# Patient Record
Sex: FEMALE | Race: OTHER | NOT HISPANIC OR LATINO
[De-identification: names, ages, dates, MRNs, and addresses within clinical notes are randomized per-mention and may not be internally consistent; named-entity substitution may affect disease eponyms.]

---

## 2021-10-06 PROBLEM — Z00.00 ENCOUNTER FOR PREVENTIVE HEALTH EXAMINATION: Status: ACTIVE | Noted: 2021-10-06

## 2023-01-03 ENCOUNTER — NON-APPOINTMENT (OUTPATIENT)
Age: 78
End: 2023-01-03

## 2023-01-03 ENCOUNTER — APPOINTMENT (OUTPATIENT)
Dept: ORTHOPEDIC SURGERY | Facility: CLINIC | Age: 78
End: 2023-01-03
Payer: MEDICARE

## 2023-01-03 VITALS
SYSTOLIC BLOOD PRESSURE: 155 MMHG | BODY MASS INDEX: 41.23 KG/M2 | DIASTOLIC BLOOD PRESSURE: 87 MMHG | HEART RATE: 67 BPM | WEIGHT: 210 LBS | HEIGHT: 60 IN

## 2023-01-03 DIAGNOSIS — M17.11 UNILATERAL PRIMARY OSTEOARTHRITIS, RIGHT KNEE: ICD-10-CM

## 2023-01-03 DIAGNOSIS — M17.12 UNILATERAL PRIMARY OSTEOARTHRITIS, LEFT KNEE: ICD-10-CM

## 2023-01-03 PROCEDURE — 99204 OFFICE O/P NEW MOD 45 MIN: CPT

## 2023-01-03 PROCEDURE — 73562 X-RAY EXAM OF KNEE 3: CPT | Mod: LT,RT

## 2023-01-03 NOTE — END OF VISIT
[FreeTextEntry3] : I, Dr. Wolf, personally performed the evaluation and management services for this established patient who presented today with a new problem/exacerbation of an existing condition. That E/M includes conducting the examination, assessing all new/exacerbated conditions, and establishing a new plan of care. Today, MY ACP was here to assist my evaluation and management services of this new problem/exacerbated condition to be followed going forward.\par

## 2023-01-03 NOTE — HISTORY OF PRESENT ILLNESS
[Worsening] : worsening [8] : a current pain level of 8/10 [Sitting] : sitting [Standing] : standing [Constant] : ~He/She~ states the symptoms seem to be constant [Bending] : worsened by bending [Direct Pressure] : worsened by direct pressure [Walking] : worsened by walking [Knee Flexion] : worsened with knee flexion [Knee Extension] : worsened with knee extension [Acetaminophen] : relieved by acetaminophen [NSAIDs] : relieved by nonsteroidal anti-inflammatory drugs [Rest] : relieved by rest [de-identified] : 77-year-old female presents with bilateral knee pain ongoing for several years worsening over the past several years as well.  She notes that the pain began on its own and has gradually worsened.  She has tried viscosupplementation injections as well as cortisone injections without relief.  Both knees hurt constantly more so at night.  She notes she has a difficult time getting up from a seated position.  She is ambulating with a rolling walker due to the inability to use the knees as well as to pain.  She is taking Celebrex as well as Tylenol with some relief.  No history of any injuries.  She states both knees are relatively about the same in terms of pain level.  She rates the pain as an 8 out of 10\par Denies any fevers chills chest pain calf pain shortness of breath

## 2023-01-03 NOTE — PHYSICAL EXAM
[Antalgic] : antalgic [Walker] : ambulates with walker [LE] : Sensory: Intact in bilateral lower extremities [DP] : dorsalis pedis 2+ and symmetric bilaterally [Normal RLE] : Right Lower Extremity: No scars, rashes, lesions, ulcers, skin intact [Normal LLE] : Left Lower Extremity: No scars, rashes, lesions, ulcers, skin intact [Normal Touch] : sensation intact for touch [Normal] : no peripheral adenopathy appreciated [de-identified] : On physical exam of the right knee skin is warm and dry without erythema ecchymosis signs or symptoms of infection superficial or deep.  There is no tenderness to palpation.  Range of motion is 0-120 with pain at extremes of flexion.  The knee is stable in varus and valgus stresses.  There is no anterior posterior drawer.  There is a negative Homans.  2+ dorsalis pedis pulses.  Sensation is intact throughout the distal lower extremities strength is well-maintained.\par \par On physical exam of the left knee skin is warm and dry without erythema ecchymosis signs or symptoms of infection superficial or deep.  There is no tenderness to palpation.  Range of motion is 0-120 with pain at extremes of flexion.  The knee is stable in varus and valgus stresses.  There is no anterior posterior drawer.  There is a negative Homans.  2+ dorsalis pedis pulses.  Sensation is intact throughout the distal lower extremities strength is well-maintained. [de-identified] : Radiographs of the right and left knees were performed today in the [facility] office.\par Grade 4 bone-on-bone cartilaginous wear in the medial compartment.\par Subchondral sclerosis noted on both sides the T-F  joint.\par Osteophytes are noted in all three compartments.\par

## 2023-01-03 NOTE — DISCUSSION/SUMMARY
[Medication Risks Reviewed] : Medication risks reviewed [Surgical risks reviewed] : Surgical risks reviewed [de-identified] : Dr. Wolf evaluated this patient and will be guiding this patient's entire orthopedic management plan. The patient was advised that based upon their clinical presentation and radiographic findings they meet inclusion criteria for benefitting from a left total knee arthroplasty as a treatment option for severe arthritis of the knee. Patient has tried and failed conservative management including viscosupplementation and cortisone injections\par \par The spectrum of treatments for severe knee DJD were reviewed in detail. I reviewed in detail the goals, alternatives, risks and benefits of total knee arthroplasty. The patient was demonstrated. A model of the total knee replacement. Advised on the brand and preliminary model of the implant that I use. The patient agrees to this plan of care.\par \par The patient was advised of the possible complications which are inclusive of but not exclusive to VTE-related, infectious-related, anesthetic-related, surgically-related, medically-related, and implant-related.\par \par The patient was advised that they will require a medical preoperative risk evaluation by their PCP. Further medical subspecialty clearances such as cardiac may be indicated if felt needed by their PCP.\par \par The patient was advised he/she may call our office after careful consideration of their treatment options and further consultation with any other physician, to begin the process of preoperative planning for elective left TKR at a time of their choosing.

## 2023-02-24 ENCOUNTER — OUTPATIENT (OUTPATIENT)
Dept: OUTPATIENT SERVICES | Facility: HOSPITAL | Age: 78
LOS: 1 days | End: 2023-02-24
Payer: MEDICARE

## 2023-02-24 VITALS
RESPIRATION RATE: 14 BRPM | WEIGHT: 214.95 LBS | SYSTOLIC BLOOD PRESSURE: 140 MMHG | DIASTOLIC BLOOD PRESSURE: 80 MMHG | OXYGEN SATURATION: 99 % | HEIGHT: 61 IN | HEART RATE: 66 BPM | TEMPERATURE: 98 F

## 2023-02-24 DIAGNOSIS — Z90.49 ACQUIRED ABSENCE OF OTHER SPECIFIED PARTS OF DIGESTIVE TRACT: Chronic | ICD-10-CM

## 2023-02-24 DIAGNOSIS — Z01.818 ENCOUNTER FOR OTHER PREPROCEDURAL EXAMINATION: ICD-10-CM

## 2023-02-24 DIAGNOSIS — Z98.890 OTHER SPECIFIED POSTPROCEDURAL STATES: Chronic | ICD-10-CM

## 2023-02-24 DIAGNOSIS — Z98.51 TUBAL LIGATION STATUS: Chronic | ICD-10-CM

## 2023-02-24 DIAGNOSIS — M17.12 UNILATERAL PRIMARY OSTEOARTHRITIS, LEFT KNEE: ICD-10-CM

## 2023-02-24 DIAGNOSIS — Z98.49 CATARACT EXTRACTION STATUS, UNSPECIFIED EYE: Chronic | ICD-10-CM

## 2023-02-24 LAB
A1C WITH ESTIMATED AVERAGE GLUCOSE RESULT: 6.7 % — HIGH (ref 4–5.6)
ALBUMIN SERPL ELPH-MCNC: 3.7 G/DL — SIGNIFICANT CHANGE UP (ref 3.3–5)
ALP SERPL-CCNC: 114 U/L — SIGNIFICANT CHANGE UP (ref 30–120)
ALT FLD-CCNC: 18 U/L DA — SIGNIFICANT CHANGE UP (ref 10–60)
ANION GAP SERPL CALC-SCNC: 7 MMOL/L — SIGNIFICANT CHANGE UP (ref 5–17)
APTT BLD: 29.9 SEC — SIGNIFICANT CHANGE UP (ref 27.5–35.5)
AST SERPL-CCNC: 21 U/L — SIGNIFICANT CHANGE UP (ref 10–40)
BILIRUB SERPL-MCNC: 0.7 MG/DL — SIGNIFICANT CHANGE UP (ref 0.2–1.2)
BLD GP AB SCN SERPL QL: SIGNIFICANT CHANGE UP
BUN SERPL-MCNC: 25 MG/DL — HIGH (ref 7–23)
CALCIUM SERPL-MCNC: 9.2 MG/DL — SIGNIFICANT CHANGE UP (ref 8.4–10.5)
CHLORIDE SERPL-SCNC: 105 MMOL/L — SIGNIFICANT CHANGE UP (ref 96–108)
CO2 SERPL-SCNC: 28 MMOL/L — SIGNIFICANT CHANGE UP (ref 22–31)
CREAT SERPL-MCNC: 1.59 MG/DL — HIGH (ref 0.5–1.3)
EGFR: 33 ML/MIN/1.73M2 — LOW
ESTIMATED AVERAGE GLUCOSE: 146 MG/DL — HIGH (ref 68–114)
GLUCOSE SERPL-MCNC: 128 MG/DL — HIGH (ref 70–99)
HCT VFR BLD CALC: 41.6 % — SIGNIFICANT CHANGE UP (ref 34.5–45)
HGB BLD-MCNC: 14 G/DL — SIGNIFICANT CHANGE UP (ref 11.5–15.5)
INR BLD: 1.11 RATIO — SIGNIFICANT CHANGE UP (ref 0.88–1.16)
MCHC RBC-ENTMCNC: 30.9 PG — SIGNIFICANT CHANGE UP (ref 27–34)
MCHC RBC-ENTMCNC: 33.7 GM/DL — SIGNIFICANT CHANGE UP (ref 32–36)
MCV RBC AUTO: 91.8 FL — SIGNIFICANT CHANGE UP (ref 80–100)
NRBC # BLD: 0 /100 WBCS — SIGNIFICANT CHANGE UP (ref 0–0)
PLATELET # BLD AUTO: 238 K/UL — SIGNIFICANT CHANGE UP (ref 150–400)
POTASSIUM SERPL-MCNC: 5.3 MMOL/L — SIGNIFICANT CHANGE UP (ref 3.5–5.3)
POTASSIUM SERPL-SCNC: 5.3 MMOL/L — SIGNIFICANT CHANGE UP (ref 3.5–5.3)
PROT SERPL-MCNC: 7.4 G/DL — SIGNIFICANT CHANGE UP (ref 6–8.3)
PROTHROM AB SERPL-ACNC: 13.1 SEC — SIGNIFICANT CHANGE UP (ref 10.5–13.4)
RBC # BLD: 4.53 M/UL — SIGNIFICANT CHANGE UP (ref 3.8–5.2)
RBC # FLD: 13.2 % — SIGNIFICANT CHANGE UP (ref 10.3–14.5)
SODIUM SERPL-SCNC: 140 MMOL/L — SIGNIFICANT CHANGE UP (ref 135–145)
WBC # BLD: 7.29 K/UL — SIGNIFICANT CHANGE UP (ref 3.8–10.5)
WBC # FLD AUTO: 7.29 K/UL — SIGNIFICANT CHANGE UP (ref 3.8–10.5)

## 2023-02-24 PROCEDURE — 93010 ELECTROCARDIOGRAM REPORT: CPT

## 2023-02-24 PROCEDURE — 93005 ELECTROCARDIOGRAM TRACING: CPT

## 2023-02-24 PROCEDURE — G0463: CPT

## 2023-02-24 RX ORDER — GLUCAGON INJECTION, SOLUTION 0.5 MG/.1ML
1 INJECTION, SOLUTION SUBCUTANEOUS ONCE
Refills: 0 | Status: DISCONTINUED | OUTPATIENT
Start: 2023-03-15 | End: 2023-03-16

## 2023-02-24 RX ORDER — DEXTROSE 50 % IN WATER 50 %
15 SYRINGE (ML) INTRAVENOUS ONCE
Refills: 0 | Status: DISCONTINUED | OUTPATIENT
Start: 2023-03-15 | End: 2023-03-16

## 2023-02-24 RX ORDER — DEXTROSE 50 % IN WATER 50 %
25 SYRINGE (ML) INTRAVENOUS ONCE
Refills: 0 | Status: DISCONTINUED | OUTPATIENT
Start: 2023-03-15 | End: 2023-03-16

## 2023-02-24 RX ORDER — DEXTROSE 50 % IN WATER 50 %
12.5 SYRINGE (ML) INTRAVENOUS ONCE
Refills: 0 | Status: DISCONTINUED | OUTPATIENT
Start: 2023-03-15 | End: 2023-03-16

## 2023-02-24 NOTE — H&P PST ADULT - PROBLEM SELECTOR PLAN 1
scheduled for left knee replacement on 3/15/23   will obtain medical and cardiac clearance   Pre op instructions on wash, medication ;instructed to take  coreg and allopurinol on DOS   Patient refused ERAS ; Gatorade ;   COVID testing on 3/12/23 at [facility]  POCT , BS on admit   Hypoglycemia protocol

## 2023-02-24 NOTE — H&P PST ADULT - NSICDXPASTSURGICALHX_GEN_ALL_CORE_FT
PAST SURGICAL HISTORY:  S/P arthroscopy of left shoulder     S/P cataract surgery     S/P cholecystectomy     S/P cystoscopy     S/P tubal ligation

## 2023-02-24 NOTE — H&P PST ADULT - NSICDXPASTMEDICALHX_GEN_ALL_CORE_FT
PAST MEDICAL HISTORY:  HLD (hyperlipidemia)     HTN (hypertension)     Osteoarthritis of left knee     Type II diabetes mellitus     Uric acid kidney stone      PAST MEDICAL HISTORY:  HLD (hyperlipidemia)     HTN (hypertension)     Morbid obesity with BMI of 40.0-44.9, adult     Osteoarthritis of left knee     Type II diabetes mellitus     Uric acid kidney stone

## 2023-02-24 NOTE — H&P PST ADULT - OTHER CARE PROVIDERS
Dr Connolly [facility]  cardiologist  Dr Connolly [facility]  cardiologist

## 2023-02-24 NOTE — H&P PST ADULT - RESPIRATORY
clear to auscultation bilaterally/no wheezes/no rales clear to auscultation bilaterally/no wheezes/no rales/no rhonchi

## 2023-02-24 NOTE — H&P PST ADULT - NSICDXFAMILYHX_GEN_ALL_CORE_FT
FAMILY HISTORY:  Father  Still living? No  Family history of colon cancer in father, Age at diagnosis: Age Unknown    Mother  Still living? No  FH: lymphoma, malignant, Age at diagnosis: Age Unknown    Sibling  Still living? Yes, Estimated age: Age Unknown  FH: renal cell carcinoma, Age at diagnosis: Age Unknown

## 2023-02-24 NOTE — H&P PST ADULT - HISTORY OF PRESENT ILLNESS
This is a 79 y/o female who presents with longstanding history of bilateral knee pain worse pain in left knee . Reports pain with walking and getting up from sitting position . scheduled for left knee replacement on 3/15/23  This is a 79 y/o morbid obese  female who presents with longstanding history of bilateral knee pain and difficulty walking  worse pain in left knee . Reports pain with walking and getting up from sitting position .patient ambulates with walker  scheduled for left knee replacement on 3/15/23

## 2023-02-25 LAB
MRSA PCR RESULT.: SIGNIFICANT CHANGE UP
S AUREUS DNA NOSE QL NAA+PROBE: SIGNIFICANT CHANGE UP

## 2023-03-09 PROBLEM — M17.12 UNILATERAL PRIMARY OSTEOARTHRITIS, LEFT KNEE: Chronic | Status: ACTIVE | Noted: 2023-02-24

## 2023-03-09 PROBLEM — N20.0 CALCULUS OF KIDNEY: Chronic | Status: ACTIVE | Noted: 2023-02-24

## 2023-03-09 PROBLEM — E78.5 HYPERLIPIDEMIA, UNSPECIFIED: Chronic | Status: ACTIVE | Noted: 2023-02-24

## 2023-03-09 PROBLEM — E11.9 TYPE 2 DIABETES MELLITUS WITHOUT COMPLICATIONS: Chronic | Status: ACTIVE | Noted: 2023-02-24

## 2023-03-09 PROBLEM — I10 ESSENTIAL (PRIMARY) HYPERTENSION: Chronic | Status: ACTIVE | Noted: 2023-02-24

## 2023-03-09 PROBLEM — E66.01 MORBID (SEVERE) OBESITY DUE TO EXCESS CALORIES: Chronic | Status: ACTIVE | Noted: 2023-02-24

## 2023-03-13 LAB — SARS-COV-2 N GENE NPH QL NAA+PROBE: NOT DETECTED

## 2023-03-14 NOTE — PATIENT PROFILE ADULT - NSPROPTRIGHTBILLOFRIGHTS_GEN_A_NUR
Discharge Instructions After Your Colonoscopy    You have received Sedation/Anesthesia for your procedure.  Side effects from these medications can make you groggy and dull your reflexes today, so to keep you safe, we ask you to follow these guidelines:    General guidelines  - Do not drive a car or operate machines for 24 hours after the test.  - Do not make important decisions for 24 hours after the test.  - Restart all normal activities the day after your test.  - Do not drink alcohol for 24 hours after the test.    Complications  Complications from these tests are rare, but may include:  - Perforation (tear in the wall of the bowel)  - Bleeding  - Infection  - Drug reactions such as nausea and vomiting    Symptoms you should watch for after your procedure:  - Severe pain  - Abdomen (belly) is distended (swollen)and hard  - Possible signs of infection at your IV site include fever, swelling, heat, drainage, or redness  - Rectal bleeding of more than 1/2 cup. If you have hemorrhoids that are prone to bleeding, or if polyps were removed, you may see a small amount of blood on toilet paper when you wipe, or a drop or two in the toilet.  - Fever over 101º F  - Nausea or vomiting that is new to you as a result of your procedure    Pain   - There should be little or no pain after your procedure.  - You may continue to pass gas for the next several hours.  - If you have pain that is not relieved by passing gas, or pain that is new to you as a result of the procedure, call your doctor at the number below.    If you develop any of these symptoms, please call your doctor at [phone]  during office hours. Call the hospital  at [phone] after 5pm, weekends, holidays and ask for the GI physician on call for [place].      Results  Three colon polyps found and removed.   One of the polyps required a several small silver clips to prevent bleeding. These clips will fall off in 10-14 days, you may  or may not see them pass in your stool.    Diverticulosis and Hemorrhoids noted.    If biopsies were taken, or polyps removed, your doctor will call you with the results within 10 days, and any further recommendations.        DIET THAT CAN CAUSE BLOATIN. COMPLEX CARBOHYDRATES SHOULD BE EITHER MINIMIZED OR AVOIDED based on type:  This kind of food, although required in normal circumstances, can cause bloating to certain people with irritable bowel syndrome. These starchy carbohydrates include foods such as:  Pasta (avoid or use in small amounts especially during daytime)  Dry beans (avoid)  Humus (avoid)  Cereal (minimize)  Rice (minimize)  Bread (minimize)  Corn (minimize)  Carrots (minimize)  Potatoes (minimize)  2. HIGH FIBER DIET NEEDS TO TO BE MOVED TO THE AFTERNOON, FEW KINDS BETTER AVOIDED:  Fruits and vegetables and high fiber/grain food, are better consumed in the late afternoon rather than early morning or lunch time as they can be really bloating. The ones that are especially bloating are:  Broccoli (avoid)  Coleslaw and cabbage (avoid)  Cauliflower (avoid)  Beans (avoid)  Sprouts (avoid)  3.  AVOID Soft beverages, candies, chewing gums and Sweeteners like:  Caffeinated drinks   Carbonated drinks (all kinds of sodas)  Alcohol  Energy drinks and no-added sugar soft drinks and different kinds of juices in general as they have added natural sweeteners in general  Any foods or sweets that contain natural sweeteners, especially the ones labeled as \"no added sugar\"  Excess use of natural sweeteners like Splenda or Truvia  4. DAIRY PRODUCTS:  Those can be really bloating if you have lactose intolerance. I would recommend one to two weeks trial of lactose free (no dairy) diet, and if you notice significant improvement you may try lactose-free milk (Lactaid Milk or Reid's Easy Milk) or use Lactaid pills (Lactase Enzyme supplement) 1-2 pills per serving with dairy products as needed but still be careful with  sweets made out of milk like ice cream and pudding, as well as creamy soaps and white sauces and alike foods      Instructions about Medications:  Start MiraLAX 17 g in 8 ounces of water daily and titrate as needed.   Resume normal home medications       patient

## 2023-03-15 ENCOUNTER — INPATIENT (INPATIENT)
Facility: HOSPITAL | Age: 78
LOS: 0 days | Discharge: HOME | DRG: 470 | End: 2023-03-16
Attending: ORTHOPAEDIC SURGERY | Admitting: ORTHOPAEDIC SURGERY
Payer: COMMERCIAL

## 2023-03-15 ENCOUNTER — TRANSCRIPTION ENCOUNTER (OUTPATIENT)
Age: 78
End: 2023-03-15

## 2023-03-15 ENCOUNTER — APPOINTMENT (OUTPATIENT)
Dept: ORTHOPEDIC SURGERY | Facility: HOSPITAL | Age: 78
End: 2023-03-15

## 2023-03-15 VITALS
HEIGHT: 61 IN | DIASTOLIC BLOOD PRESSURE: 69 MMHG | OXYGEN SATURATION: 100 % | RESPIRATION RATE: 13 BRPM | HEART RATE: 74 BPM | WEIGHT: 214.51 LBS | SYSTOLIC BLOOD PRESSURE: 160 MMHG | TEMPERATURE: 98 F

## 2023-03-15 DIAGNOSIS — M17.12 UNILATERAL PRIMARY OSTEOARTHRITIS, LEFT KNEE: ICD-10-CM

## 2023-03-15 DIAGNOSIS — Z90.49 ACQUIRED ABSENCE OF OTHER SPECIFIED PARTS OF DIGESTIVE TRACT: Chronic | ICD-10-CM

## 2023-03-15 DIAGNOSIS — Z01.818 ENCOUNTER FOR OTHER PREPROCEDURAL EXAMINATION: ICD-10-CM

## 2023-03-15 DIAGNOSIS — Z98.51 TUBAL LIGATION STATUS: Chronic | ICD-10-CM

## 2023-03-15 DIAGNOSIS — Z98.890 OTHER SPECIFIED POSTPROCEDURAL STATES: Chronic | ICD-10-CM

## 2023-03-15 DIAGNOSIS — Z98.49 CATARACT EXTRACTION STATUS, UNSPECIFIED EYE: Chronic | ICD-10-CM

## 2023-03-15 LAB
ABO RH CONFIRMATION: SIGNIFICANT CHANGE UP
ANION GAP SERPL CALC-SCNC: 10 MMOL/L — SIGNIFICANT CHANGE UP (ref 5–17)
BUN SERPL-MCNC: 29 MG/DL — HIGH (ref 7–23)
CALCIUM SERPL-MCNC: 8.3 MG/DL — LOW (ref 8.4–10.5)
CHLORIDE SERPL-SCNC: 101 MMOL/L — SIGNIFICANT CHANGE UP (ref 96–108)
CO2 SERPL-SCNC: 24 MMOL/L — SIGNIFICANT CHANGE UP (ref 22–31)
CREAT SERPL-MCNC: 1.62 MG/DL — HIGH (ref 0.5–1.3)
EGFR: 33 ML/MIN/1.73M2 — LOW
GLUCOSE BLDC GLUCOMTR-MCNC: 141 MG/DL — HIGH (ref 70–99)
GLUCOSE BLDC GLUCOMTR-MCNC: 278 MG/DL — HIGH (ref 70–99)
GLUCOSE SERPL-MCNC: 285 MG/DL — HIGH (ref 70–99)
HCT VFR BLD CALC: 40.3 % — SIGNIFICANT CHANGE UP (ref 34.5–45)
HGB BLD-MCNC: 13.5 G/DL — SIGNIFICANT CHANGE UP (ref 11.5–15.5)
MCHC RBC-ENTMCNC: 31.3 PG — SIGNIFICANT CHANGE UP (ref 27–34)
MCHC RBC-ENTMCNC: 33.5 GM/DL — SIGNIFICANT CHANGE UP (ref 32–36)
MCV RBC AUTO: 93.3 FL — SIGNIFICANT CHANGE UP (ref 80–100)
NRBC # BLD: 0 /100 WBCS — SIGNIFICANT CHANGE UP (ref 0–0)
PLATELET # BLD AUTO: 175 K/UL — SIGNIFICANT CHANGE UP (ref 150–400)
POTASSIUM SERPL-MCNC: 5.4 MMOL/L — HIGH (ref 3.5–5.3)
POTASSIUM SERPL-SCNC: 5.4 MMOL/L — HIGH (ref 3.5–5.3)
RBC # BLD: 4.32 M/UL — SIGNIFICANT CHANGE UP (ref 3.8–5.2)
RBC # FLD: 13 % — SIGNIFICANT CHANGE UP (ref 10.3–14.5)
SODIUM SERPL-SCNC: 135 MMOL/L — SIGNIFICANT CHANGE UP (ref 135–145)
WBC # BLD: 11.89 K/UL — HIGH (ref 3.8–10.5)
WBC # FLD AUTO: 11.89 K/UL — HIGH (ref 3.8–10.5)

## 2023-03-15 PROCEDURE — 99222 1ST HOSP IP/OBS MODERATE 55: CPT

## 2023-03-15 PROCEDURE — 27447 TOTAL KNEE ARTHROPLASTY: CPT | Mod: AS,LT

## 2023-03-15 PROCEDURE — 27447 TOTAL KNEE ARTHROPLASTY: CPT | Mod: LT

## 2023-03-15 PROCEDURE — 73560 X-RAY EXAM OF KNEE 1 OR 2: CPT | Mod: 26,LT

## 2023-03-15 DEVICE — IMPLANTABLE DEVICE: Type: IMPLANTABLE DEVICE | Status: FUNCTIONAL

## 2023-03-15 DEVICE — INSERT TIB NONPOROUS UNIV SZ4 LT: Type: IMPLANTABLE DEVICE | Status: FUNCTIONAL

## 2023-03-15 DEVICE — STEM MOD UNIV TIB 10X40MM: Type: IMPLANTABLE DEVICE | Status: FUNCTIONAL

## 2023-03-15 DEVICE — BONE WAX 2.5GM: Type: IMPLANTABLE DEVICE | Status: FUNCTIONAL

## 2023-03-15 DEVICE — STEM MOD UNIV TIB 10X65MM: Type: IMPLANTABLE DEVICE | Status: FUNCTIONAL

## 2023-03-15 DEVICE — COMP PATELLA TRI-PEG E-PLUS POLY 8X32MM: Type: IMPLANTABLE DEVICE | Status: FUNCTIONAL

## 2023-03-15 DEVICE — KIT PREP IM TOT HIP: Type: IMPLANTABLE DEVICE | Status: FUNCTIONAL

## 2023-03-15 RX ORDER — ACETAMINOPHEN 500 MG
1000 TABLET ORAL ONCE
Refills: 0 | Status: COMPLETED | OUTPATIENT
Start: 2023-03-15 | End: 2023-03-15

## 2023-03-15 RX ORDER — POLYETHYLENE GLYCOL 3350 17 G/17G
17 POWDER, FOR SOLUTION ORAL AT BEDTIME
Refills: 0 | Status: DISCONTINUED | OUTPATIENT
Start: 2023-03-15 | End: 2023-03-16

## 2023-03-15 RX ORDER — DEXTROSE 50 % IN WATER 50 %
12.5 SYRINGE (ML) INTRAVENOUS ONCE
Refills: 0 | Status: DISCONTINUED | OUTPATIENT
Start: 2023-03-15 | End: 2023-03-16

## 2023-03-15 RX ORDER — APREPITANT 80 MG/1
40 CAPSULE ORAL ONCE
Refills: 0 | Status: COMPLETED | OUTPATIENT
Start: 2023-03-15 | End: 2023-03-15

## 2023-03-15 RX ORDER — DEXTROSE 50 % IN WATER 50 %
25 SYRINGE (ML) INTRAVENOUS ONCE
Refills: 0 | Status: DISCONTINUED | OUTPATIENT
Start: 2023-03-15 | End: 2023-03-16

## 2023-03-15 RX ORDER — TRANEXAMIC ACID 100 MG/ML
1000 INJECTION, SOLUTION INTRAVENOUS ONCE
Refills: 0 | Status: COMPLETED | OUTPATIENT
Start: 2023-03-15 | End: 2023-03-15

## 2023-03-15 RX ORDER — ACETAMINOPHEN 500 MG
1000 TABLET ORAL EVERY 8 HOURS
Refills: 0 | Status: DISCONTINUED | OUTPATIENT
Start: 2023-03-16 | End: 2023-03-16

## 2023-03-15 RX ORDER — ONDANSETRON 8 MG/1
4 TABLET, FILM COATED ORAL ONCE
Refills: 0 | Status: DISCONTINUED | OUTPATIENT
Start: 2023-03-15 | End: 2023-03-15

## 2023-03-15 RX ORDER — SODIUM CHLORIDE 9 MG/ML
1000 INJECTION, SOLUTION INTRAVENOUS
Refills: 0 | Status: DISCONTINUED | OUTPATIENT
Start: 2023-03-15 | End: 2023-03-16

## 2023-03-15 RX ORDER — ROSUVASTATIN CALCIUM 5 MG/1
0 TABLET ORAL
Qty: 0 | Refills: 0 | DISCHARGE

## 2023-03-15 RX ORDER — GLUCAGON INJECTION, SOLUTION 0.5 MG/.1ML
1 INJECTION, SOLUTION SUBCUTANEOUS ONCE
Refills: 0 | Status: DISCONTINUED | OUTPATIENT
Start: 2023-03-15 | End: 2023-03-16

## 2023-03-15 RX ORDER — INSULIN LISPRO 100/ML
VIAL (ML) SUBCUTANEOUS AT BEDTIME
Refills: 0 | Status: DISCONTINUED | OUTPATIENT
Start: 2023-03-15 | End: 2023-03-16

## 2023-03-15 RX ORDER — PANTOPRAZOLE SODIUM 20 MG/1
40 TABLET, DELAYED RELEASE ORAL
Refills: 0 | Status: DISCONTINUED | OUTPATIENT
Start: 2023-03-15 | End: 2023-03-16

## 2023-03-15 RX ORDER — SODIUM CHLORIDE 9 MG/ML
1000 INJECTION, SOLUTION INTRAVENOUS
Refills: 0 | Status: DISCONTINUED | OUTPATIENT
Start: 2023-03-15 | End: 2023-03-15

## 2023-03-15 RX ORDER — ONDANSETRON 8 MG/1
4 TABLET, FILM COATED ORAL EVERY 6 HOURS
Refills: 0 | Status: DISCONTINUED | OUTPATIENT
Start: 2023-03-15 | End: 2023-03-16

## 2023-03-15 RX ORDER — OXYCODONE HYDROCHLORIDE 5 MG/1
10 TABLET ORAL
Refills: 0 | Status: DISCONTINUED | OUTPATIENT
Start: 2023-03-15 | End: 2023-03-16

## 2023-03-15 RX ORDER — ATORVASTATIN CALCIUM 80 MG/1
20 TABLET, FILM COATED ORAL AT BEDTIME
Refills: 0 | Status: DISCONTINUED | OUTPATIENT
Start: 2023-03-15 | End: 2023-03-16

## 2023-03-15 RX ORDER — CEFAZOLIN SODIUM 1 G
2000 VIAL (EA) INJECTION ONCE
Refills: 0 | Status: COMPLETED | OUTPATIENT
Start: 2023-03-15 | End: 2023-03-15

## 2023-03-15 RX ORDER — CARVEDILOL PHOSPHATE 80 MG/1
1 CAPSULE, EXTENDED RELEASE ORAL
Qty: 0 | Refills: 0 | DISCHARGE

## 2023-03-15 RX ORDER — OXYCODONE HYDROCHLORIDE 5 MG/1
5 TABLET ORAL
Refills: 0 | Status: DISCONTINUED | OUTPATIENT
Start: 2023-03-15 | End: 2023-03-16

## 2023-03-15 RX ORDER — CEFAZOLIN SODIUM 1 G
2000 VIAL (EA) INJECTION EVERY 8 HOURS
Refills: 0 | Status: COMPLETED | OUTPATIENT
Start: 2023-03-15 | End: 2023-03-16

## 2023-03-15 RX ORDER — INSULIN LISPRO 100/ML
VIAL (ML) SUBCUTANEOUS
Refills: 0 | Status: DISCONTINUED | OUTPATIENT
Start: 2023-03-15 | End: 2023-03-16

## 2023-03-15 RX ORDER — DEXTROSE 50 % IN WATER 50 %
15 SYRINGE (ML) INTRAVENOUS ONCE
Refills: 0 | Status: DISCONTINUED | OUTPATIENT
Start: 2023-03-15 | End: 2023-03-16

## 2023-03-15 RX ORDER — POTASSIUM BICARBONATE 978 MG/1
0 TABLET, EFFERVESCENT ORAL
Qty: 0 | Refills: 0 | DISCHARGE

## 2023-03-15 RX ORDER — ALLOPURINOL 300 MG
100 TABLET ORAL DAILY
Refills: 0 | Status: DISCONTINUED | OUTPATIENT
Start: 2023-03-15 | End: 2023-03-16

## 2023-03-15 RX ORDER — DEXAMETHASONE 0.5 MG/5ML
8 ELIXIR ORAL ONCE
Refills: 0 | Status: COMPLETED | OUTPATIENT
Start: 2023-03-16 | End: 2023-03-16

## 2023-03-15 RX ORDER — CARVEDILOL PHOSPHATE 80 MG/1
12.5 CAPSULE, EXTENDED RELEASE ORAL EVERY 12 HOURS
Refills: 0 | Status: DISCONTINUED | OUTPATIENT
Start: 2023-03-15 | End: 2023-03-16

## 2023-03-15 RX ORDER — APIXABAN 2.5 MG/1
2.5 TABLET, FILM COATED ORAL EVERY 12 HOURS
Refills: 0 | Status: DISCONTINUED | OUTPATIENT
Start: 2023-03-16 | End: 2023-03-16

## 2023-03-15 RX ORDER — CHLORHEXIDINE GLUCONATE 213 G/1000ML
1 SOLUTION TOPICAL ONCE
Refills: 0 | Status: COMPLETED | OUTPATIENT
Start: 2023-03-15 | End: 2023-03-15

## 2023-03-15 RX ORDER — ALLOPURINOL 300 MG
0 TABLET ORAL
Qty: 0 | Refills: 0 | DISCHARGE

## 2023-03-15 RX ORDER — SODIUM CHLORIDE 9 MG/ML
500 INJECTION INTRAMUSCULAR; INTRAVENOUS; SUBCUTANEOUS ONCE
Refills: 0 | Status: COMPLETED | OUTPATIENT
Start: 2023-03-15 | End: 2023-03-15

## 2023-03-15 RX ORDER — HYDROMORPHONE HYDROCHLORIDE 2 MG/ML
0.5 INJECTION INTRAMUSCULAR; INTRAVENOUS; SUBCUTANEOUS
Refills: 0 | Status: DISCONTINUED | OUTPATIENT
Start: 2023-03-15 | End: 2023-03-16

## 2023-03-15 RX ORDER — SENNA PLUS 8.6 MG/1
2 TABLET ORAL AT BEDTIME
Refills: 0 | Status: DISCONTINUED | OUTPATIENT
Start: 2023-03-15 | End: 2023-03-16

## 2023-03-15 RX ORDER — HYDROMORPHONE HYDROCHLORIDE 2 MG/ML
1 INJECTION INTRAMUSCULAR; INTRAVENOUS; SUBCUTANEOUS
Refills: 0 | Status: DISCONTINUED | OUTPATIENT
Start: 2023-03-15 | End: 2023-03-15

## 2023-03-15 RX ORDER — ACETAMINOPHEN 500 MG
1000 TABLET ORAL ONCE
Refills: 0 | Status: COMPLETED | OUTPATIENT
Start: 2023-03-16 | End: 2023-03-16

## 2023-03-15 RX ORDER — MAGNESIUM HYDROXIDE 400 MG/1
30 TABLET, CHEWABLE ORAL DAILY
Refills: 0 | Status: DISCONTINUED | OUTPATIENT
Start: 2023-03-15 | End: 2023-03-16

## 2023-03-15 RX ORDER — HYDROMORPHONE HYDROCHLORIDE 2 MG/ML
0.5 INJECTION INTRAMUSCULAR; INTRAVENOUS; SUBCUTANEOUS
Refills: 0 | Status: DISCONTINUED | OUTPATIENT
Start: 2023-03-15 | End: 2023-03-15

## 2023-03-15 RX ADMIN — SODIUM CHLORIDE 100 MILLILITER(S): 9 INJECTION, SOLUTION INTRAVENOUS at 20:01

## 2023-03-15 RX ADMIN — SODIUM CHLORIDE 75 MILLILITER(S): 9 INJECTION, SOLUTION INTRAVENOUS at 15:06

## 2023-03-15 RX ADMIN — CHLORHEXIDINE GLUCONATE 1 APPLICATION(S): 213 SOLUTION TOPICAL at 09:16

## 2023-03-15 RX ADMIN — CARVEDILOL PHOSPHATE 12.5 MILLIGRAM(S): 80 CAPSULE, EXTENDED RELEASE ORAL at 20:01

## 2023-03-15 RX ADMIN — SODIUM CHLORIDE 500 MILLILITER(S): 9 INJECTION INTRAMUSCULAR; INTRAVENOUS; SUBCUTANEOUS at 18:30

## 2023-03-15 RX ADMIN — Medication 1: at 22:30

## 2023-03-15 RX ADMIN — Medication 1000 MILLIGRAM(S): at 18:49

## 2023-03-15 RX ADMIN — APREPITANT 40 MILLIGRAM(S): 80 CAPSULE ORAL at 09:17

## 2023-03-15 RX ADMIN — SODIUM CHLORIDE 100 MILLILITER(S): 9 INJECTION, SOLUTION INTRAVENOUS at 18:31

## 2023-03-15 RX ADMIN — Medication 100 MILLIGRAM(S): at 20:00

## 2023-03-15 RX ADMIN — SODIUM CHLORIDE 500 MILLILITER(S): 9 INJECTION INTRAMUSCULAR; INTRAVENOUS; SUBCUTANEOUS at 15:06

## 2023-03-15 RX ADMIN — Medication 400 MILLIGRAM(S): at 18:30

## 2023-03-15 NOTE — DISCHARGE NOTE PROVIDER - NSDCFUADDAPPT_GEN_ALL_CORE_FT
It is advisable to follow up with your primary care provider within the next 2-3 weeks to ensure your medications are appropriate and there are no underlying problems after your procedure.     Your 2 week Post-Op appointment is with Elvia Morris NP at [address] [place] [address] on March 30th. Call for confirmation or questions

## 2023-03-15 NOTE — CONSULT NOTE ADULT - ASSESSMENT
77 yo female, pmh of htn, DM, OA, obesity, presenting for left knee TKR    s/p left knee tkr  - pain management, dvt ppx as per ortho  - PT/OT  - continue IVF  - oob as tolerated    HTN - hold home antihypertensives for now, monitor bp, if elevated can restart    DM- hold home glipizide, starting correction scale only to start  monitor FS

## 2023-03-15 NOTE — DISCHARGE NOTE PROVIDER - HOSPITAL COURSE
This patient was admitted to [facility] with a history of severe degenerative joint disease of the left knee.  Patient underwent Pre-Surgical Testing and was medically cleared to undergo elective procedure. Patient underwent Left total knee arthroplasty  by Dr. Wolf on 3/15/23. Procedure was well tolerated.  No operative or tori-operative complications arose during patient's hospital course.  Patient received antibiotic according to SCIP guidelines for infection prevention.  Eliquis 2.5mg q 12 h was given for DVT prophylaxis, in addition to the use of SCDs.  Anesthesia, Medical Hospitalist, Physical Therapy and Occupational Therapy were consulted. Patient is stable for discharge with a good prognosis.  Appropriate discharge instructions and medications are provided in this document.

## 2023-03-15 NOTE — PHYSICAL THERAPY INITIAL EVALUATION ADULT - ADDITIONAL COMMENTS
Pt resides in pvt home with daughter, pt lives on first floor, dtr lives on second floor. Pt states 1 half GENOVEVA without HR, denies stairs within household. Pt has walk-in shower. Pt has RW and cane. Pt reports was independent with RW prior to admission. Pt resides in pvt home with daughter, pt lives on first floor, dtr lives on second floor. Pt states 1 half GENOV[place]A without HR, denies stairs within household. Pt has walk-in shower. Pt has RW and cane. Pt reports was independent with RW prior to admission. Pt reports pt dtr available to assist as needed.

## 2023-03-15 NOTE — CONSULT NOTE ADULT - SUBJECTIVE AND OBJECTIVE BOX
HPI:  79 yo female, pmh of htn, hld, DM, OA, obesity, presenting with left knee OA now s/p left TKR, denies current pain, still having effects of spinal anesthesia, no nausea, vomiting, dizziness, fever, chills.       PAST MEDICAL & SURGICAL HISTORY:  HTN (hypertension)      HLD (hyperlipidemia)      Uric acid kidney stone      Osteoarthritis of left knee      Type II diabetes mellitus      Morbid obesity with BMI of 40.0-44.9, adult      S/P cystoscopy      S/P arthroscopy of left shoulder      S/P cholecystectomy      S/P tubal ligation      S/P cataract surgery          ANTIMICROBIAL:    CARDIOVASCULAR:    PULMONARY:    NEUROLOGIC:  HYDROmorphone  Injectable 0.5 milliGRAM(s) IV Push every 10 minutes PRN  HYDROmorphone  Injectable 1 milliGRAM(s) IV Push every 10 minutes PRN  ondansetron Injectable 4 milliGRAM(s) IV Push once PRN    ONCOLOGIC:    HEMATOLOGIC:    GATROINTESTINAL:     MEDS:    ENDO/METABOLIC:  dextrose 50% Injectable 25 Gram(s) IV Push once  dextrose 50% Injectable 12.5 Gram(s) IV Push once  dextrose 50% Injectable 25 Gram(s) IV Push once  dextrose Oral Gel 15 Gram(s) Oral once PRN  glucagon  Injectable 1 milliGRAM(s) IntraMuscular once  insulin lispro (ADMELOG) corrective regimen sliding scale   SubCutaneous three times a day before meals    IV FLUID/NUTRITION:  dextrose 5%. 1000 milliLiter(s) IV Continuous <Continuous>  dextrose 5%. 1000 milliLiter(s) IV Continuous <Continuous>  lactated ringers. 1000 milliLiter(s) IV Continuous <Continuous>  sodium chloride 0.9% Bolus 500 milliLiter(s) IV Bolus once    TOPICAL:    IMMUNOLOGIC & OTHER        Allergies    Avelox (Rash)  penicillin (Rash)    Intolerances      PAST MEDICAL & SURGICAL HISTORY:  HTN (hypertension)      HLD (hyperlipidemia)      Uric acid kidney stone      Osteoarthritis of left knee      Type II diabetes mellitus      Morbid obesity with BMI of 40.0-44.9, adult      S/P cystoscopy      S/P arthroscopy of left shoulder      S/P cholecystectomy      S/P tubal ligation      S/P cataract surgery        SOCIAL HISTORY:    FAMILY HISTORY:  Family history of colon cancer in father (Father)    FH: lymphoma, malignant (Mother)    FH: renal cell carcinoma (Sibling)        Vital Signs Last 24 Hrs  T(C): 36.4 (15 Mar 2023 13:53), Max: 36.8 (15 Mar 2023 08:56)  T(F): 97.5 (15 Mar 2023 13:53), Max: 98.3 (15 Mar 2023 08:56)  HR: 70 (15 Mar 2023 14:08) (69 - 76)  BP: 127/65 (15 Mar 2023 14:08) (118/73 - 183/70)  BP(mean): --  RR: 13 (15 Mar 2023 14:08) (13 - 22)  SpO2: 97% (15 Mar 2023 14:08) (95% - 100%)    Parameters below as of 15 Mar 2023 14:08  Patient On (Oxygen Delivery Method): room air          I&O's Detail    15 Mar 2023 07:01  -  15 Mar 2023 14:45  --------------------------------------------------------  IN:    Lactated Ringers: 1600 mL  Total IN: 1600 mL    OUT:    Blood Loss (mL): 200 mL  Total OUT: 200 mL    Total NET: 1400 mL        Daily Height in cm: 154.94 (15 Mar 2023 08:56)    Daily     REVIEW OF SYSTEMS:    as per hpi all other systems reviewed and are negative    PHYSICAL EXAM:    VSS  CONSTITUTIONAL: elderly female nad  SKIN: Skin exam is warm and dry, no acute rash.  HEAD: Normocephalic; atraumatic.  EYES: PERRL, EOM intact; conjunctiva and sclera clear.  ENT: No nasal discharge; airway clear. TMs clear.  NECK: Supple; non tender.  CARD: S1, S2 normal; no murmurs, gallops, or rubs. Regular rate and rhythm.  RESP: No wheezes, rales or rhonchi.  ABD: Normal bowel sounds; soft; non-distended; non-tender; no hepatosplenomegaly.  EXT: + left knee in brace, no edema  LYMPH: No acute cervical adenopathy.  NEURO: Alert, oriented. No focal deficitss, + loss of sensation in bilateral le  PSYCH: Cooperative, appropriate.       LABS:                      RADIOLOGY & ADDITIONAL STUDIES:

## 2023-03-15 NOTE — DISCHARGE NOTE PROVIDER - CARE PROVIDER_API CALL
Alex Wolf)  Orthopedics  [address], [address]  [place] [address]  Phone: [phone]  Fax: [phone]  Scheduled Appointment: 03/30/2023 10:30 AM

## 2023-03-15 NOTE — DISCHARGE NOTE PROVIDER - NSDCCPCAREPLAN_GEN_ALL_CORE_FT
PRINCIPAL DISCHARGE DIAGNOSIS  Diagnosis: Osteoarthritis of left knee  Assessment and Plan of Treatment: For your total knee replacement:  Physical Therapy/Occupational Therapy for: ambulation, transfers, stairs, ADL's (activities of daily living), range of motion exercises, and isometrics  -Activity  • Weight Bearing as tolerated with rolling walker.  • Cryo/cuff 20 minutes several times daily with at least a 1 hour break in between icing sessions  • Take short, frequent walks increasing the distance that you walk each day as tolerated.  • Change your position every hour to decrease pain and stiffness.  • Continue the exercises taught to you by your physical therapist.  • No driving until cleared by the doctor.  • No tub baths, hot tubs, or swimming pools until instructed by your doctor.  • Do not squat down on the floor.  • Do not kneel or twist your knee.  • Range of Motion Goals: Flexion= 120 degrees, Extension = 0 degrees  You have a ROBSON dressing. You may shower. Disconnect ROBSON battery prior to showering. Reconnect battery after showering and press orange button to resume ROBSON power. Remove ROBSON dressing on post-op day #7.  Keep incision clean. DO NOT APPLY ANYTHING to incision site (salves/ointments/creams). Do not scrub incision site. Pat dry after shower.  Prineo removal 2 weeks after surgery at Surgeon's office.

## 2023-03-15 NOTE — DISCHARGE NOTE PROVIDER - NSDCMRMEDTOKEN_GEN_ALL_CORE_FT
allopurinol 100 mg oral tablet: orally once a day  carvedilol 12.5 mg oral tablet: 1 tab(s) orally 2 times a day  Effer-K 20 mEq oral tablet, effervescent: orally 2 times a day  glipiZIDE 10 mg oral tablet: orally 2 times a day  rosuvastatin 5 mg oral capsule: orally once a day (at bedtime)   acetaminophen 500 mg oral tablet: 2 tab(s) orally every 8 hours  allopurinol 100 mg oral tablet: orally once a day  Aspirin Enteric Coated 81 mg oral delayed release tablet: 1 tab(s) orally every 12 hours once Eliquis is completed. Take at least 2 hours after celebrex.  carvedilol 12.5 mg oral tablet: 1 tab(s) orally 2 times a day  Effer-K 20 mEq oral tablet, effervescent: orally 2 times a day  Eliquis 2.5 mg oral tablet: 1 tab(s) orally every 12 hours   glipiZIDE 10 mg oral tablet: orally 2 times a day  omeprazole 20 mg oral delayed release capsule: 1 cap(s) orally once a day   oxyCODONE 5 mg oral tablet: 1-2 tab(s) orally every 4 hours, As Needed -for moderate to severe pain MDD:6  Reference #: [id number]  rosuvastatin 5 mg oral capsule: orally once a day (at bedtime)

## 2023-03-15 NOTE — BRIEF OPERATIVE NOTE - COMMENTS
Patient tolerated the procedure well and was transported to the PACU in stable condition.   XR visualized by surgeon. No FB, No FX, Prosthesis in good alignment with proper placement

## 2023-03-15 NOTE — DISCHARGE NOTE PROVIDER - NSDCFUSCHEDAPPT_GEN_ALL_CORE_FT
Elvia Morris  [facility]  ORTHOSURG [address] [facility]  Scheduled Appointment: 03/30/2023    Alex Wolf  [facility]  ORTHOR[address]  Scheduled Appointment: 05/02/2023

## 2023-03-15 NOTE — PHYSICAL THERAPY INITIAL EVALUATION ADULT - ASSISTIVE DEVICE FOR TRANSFER: SIT/STAND, REHAB EVAL
Health Maintenance Due   Topic Date Due   • Hepatitis B Vaccine (1 of 3 - 3-dose series) Never done   • Diabetes Eye Exam  04/22/2020   • Diabetes A1C  07/17/2022   • DM/CKD Microalbumin  08/24/2022   • Depression Screening  01/20/2023       Patient is due for topics as listed above but is not proceeding with Immunization(s) Hep B at this time.    rolling walker

## 2023-03-15 NOTE — PHYSICAL THERAPY INITIAL EVALUATION ADULT - PERTINENT HX OF CURRENT PROBLEM, REHAB EVAL
Pt is a 79 yo female s/p left TKR 3/15/2. pmh of htn, hld, DM, OA, obesity, presenting with left knee OA now s/p left TKR, denies current pain, still having effects of spinal anesthesia, no nausea, vomiting, dizziness, fever, chills.

## 2023-03-16 ENCOUNTER — TRANSCRIPTION ENCOUNTER (OUTPATIENT)
Age: 78
End: 2023-03-16

## 2023-03-16 VITALS
OXYGEN SATURATION: 98 % | DIASTOLIC BLOOD PRESSURE: 72 MMHG | RESPIRATION RATE: 15 BRPM | SYSTOLIC BLOOD PRESSURE: 137 MMHG | HEART RATE: 80 BPM

## 2023-03-16 LAB
ANION GAP SERPL CALC-SCNC: 10 MMOL/L — SIGNIFICANT CHANGE UP (ref 5–17)
BUN SERPL-MCNC: 32 MG/DL — HIGH (ref 7–23)
CALCIUM SERPL-MCNC: 8.3 MG/DL — LOW (ref 8.4–10.5)
CHLORIDE SERPL-SCNC: 102 MMOL/L — SIGNIFICANT CHANGE UP (ref 96–108)
CO2 SERPL-SCNC: 22 MMOL/L — SIGNIFICANT CHANGE UP (ref 22–31)
CREAT SERPL-MCNC: 1.64 MG/DL — HIGH (ref 0.5–1.3)
EGFR: 32 ML/MIN/1.73M2 — LOW
GLUCOSE BLDC GLUCOMTR-MCNC: 296 MG/DL — HIGH (ref 70–99)
GLUCOSE SERPL-MCNC: 241 MG/DL — HIGH (ref 70–99)
HCT VFR BLD CALC: 33.9 % — LOW (ref 34.5–45)
HGB BLD-MCNC: 11.5 G/DL — SIGNIFICANT CHANGE UP (ref 11.5–15.5)
MCHC RBC-ENTMCNC: 30.6 PG — SIGNIFICANT CHANGE UP (ref 27–34)
MCHC RBC-ENTMCNC: 33.9 GM/DL — SIGNIFICANT CHANGE UP (ref 32–36)
MCV RBC AUTO: 90.2 FL — SIGNIFICANT CHANGE UP (ref 80–100)
NRBC # BLD: 0 /100 WBCS — SIGNIFICANT CHANGE UP (ref 0–0)
PLATELET # BLD AUTO: 197 K/UL — SIGNIFICANT CHANGE UP (ref 150–400)
POTASSIUM SERPL-MCNC: 4.7 MMOL/L — SIGNIFICANT CHANGE UP (ref 3.5–5.3)
POTASSIUM SERPL-SCNC: 4.7 MMOL/L — SIGNIFICANT CHANGE UP (ref 3.5–5.3)
RBC # BLD: 3.76 M/UL — LOW (ref 3.8–5.2)
RBC # FLD: 12.9 % — SIGNIFICANT CHANGE UP (ref 10.3–14.5)
SODIUM SERPL-SCNC: 134 MMOL/L — LOW (ref 135–145)
WBC # BLD: 13.22 K/UL — HIGH (ref 3.8–10.5)
WBC # FLD AUTO: 13.22 K/UL — HIGH (ref 3.8–10.5)

## 2023-03-16 PROCEDURE — 97530 THERAPEUTIC ACTIVITIES: CPT

## 2023-03-16 PROCEDURE — 97161 PT EVAL LOW COMPLEX 20 MIN: CPT

## 2023-03-16 PROCEDURE — 80048 BASIC METABOLIC PNL TOTAL CA: CPT

## 2023-03-16 PROCEDURE — 97535 SELF CARE MNGMENT TRAINING: CPT

## 2023-03-16 PROCEDURE — 36415 COLL VENOUS BLD VENIPUNCTURE: CPT

## 2023-03-16 PROCEDURE — 82962 GLUCOSE BLOOD TEST: CPT

## 2023-03-16 PROCEDURE — C1776: CPT

## 2023-03-16 PROCEDURE — 97116 GAIT TRAINING THERAPY: CPT

## 2023-03-16 PROCEDURE — 27447 TOTAL KNEE ARTHROPLASTY: CPT

## 2023-03-16 PROCEDURE — 97165 OT EVAL LOW COMPLEX 30 MIN: CPT

## 2023-03-16 PROCEDURE — 94664 DEMO&/EVAL PT USE INHALER: CPT

## 2023-03-16 PROCEDURE — 97110 THERAPEUTIC EXERCISES: CPT

## 2023-03-16 PROCEDURE — 85027 COMPLETE CBC AUTOMATED: CPT

## 2023-03-16 PROCEDURE — 99232 SBSQ HOSP IP/OBS MODERATE 35: CPT

## 2023-03-16 PROCEDURE — C1713: CPT

## 2023-03-16 PROCEDURE — 73560 X-RAY EXAM OF KNEE 1 OR 2: CPT

## 2023-03-16 PROCEDURE — C1889: CPT

## 2023-03-16 RX ORDER — OXYCODONE HYDROCHLORIDE 5 MG/1
1 TABLET ORAL
Qty: 42 | Refills: 0
Start: 2023-03-16 | End: 2023-03-22

## 2023-03-16 RX ORDER — OMEPRAZOLE 10 MG/1
1 CAPSULE, DELAYED RELEASE ORAL
Qty: 30 | Refills: 0
Start: 2023-03-16 | End: 2023-04-14

## 2023-03-16 RX ORDER — ACETAMINOPHEN 500 MG
2 TABLET ORAL
Qty: 0 | Refills: 0 | DISCHARGE
Start: 2023-03-16

## 2023-03-16 RX ORDER — ASPIRIN/CALCIUM CARB/MAGNESIUM 324 MG
1 TABLET ORAL
Qty: 28 | Refills: 0
Start: 2023-03-16 | End: 2023-03-29

## 2023-03-16 RX ORDER — APIXABAN 2.5 MG/1
1 TABLET, FILM COATED ORAL
Qty: 27 | Refills: 0
Start: 2023-03-16 | End: 2023-03-29

## 2023-03-16 RX ADMIN — Medication 1000 MILLIGRAM(S): at 00:53

## 2023-03-16 RX ADMIN — Medication 3: at 12:28

## 2023-03-16 RX ADMIN — CARVEDILOL PHOSPHATE 12.5 MILLIGRAM(S): 80 CAPSULE, EXTENDED RELEASE ORAL at 09:09

## 2023-03-16 RX ADMIN — Medication 1000 MILLIGRAM(S): at 06:08

## 2023-03-16 RX ADMIN — Medication 1000 MILLIGRAM(S): at 14:23

## 2023-03-16 RX ADMIN — APIXABAN 2.5 MILLIGRAM(S): 2.5 TABLET, FILM COATED ORAL at 09:09

## 2023-03-16 RX ADMIN — Medication 100 MILLIGRAM(S): at 12:33

## 2023-03-16 RX ADMIN — Medication 100 MILLIGRAM(S): at 03:52

## 2023-03-16 RX ADMIN — Medication 1000 MILLIGRAM(S): at 06:10

## 2023-03-16 RX ADMIN — PANTOPRAZOLE SODIUM 40 MILLIGRAM(S): 20 TABLET, DELAYED RELEASE ORAL at 06:08

## 2023-03-16 RX ADMIN — Medication 101.6 MILLIGRAM(S): at 06:08

## 2023-03-16 RX ADMIN — Medication 400 MILLIGRAM(S): at 00:44

## 2023-03-16 RX ADMIN — Medication 1000 MILLIGRAM(S): at 14:53

## 2023-03-16 NOTE — PATIENT CHOICE NOTE. - NSPTCHOICESTATE_GEN_ALL_CORE

## 2023-03-16 NOTE — DISCHARGE NOTE NURSING/CASE MANAGEMENT/SOCIAL WORK - NSDCDMENAME_GEN_ALL_CORE_FT
Prior to admission you have a rolling walker and hector at home. Prior to admission you have a rolling walker, rollator and hector at home.

## 2023-03-16 NOTE — CARE COORDINATION ASSESSMENT. - NSDCPLANSERVICES_GEN_ALL_CORE
Met with  pt at the bedside and reviewed role and availability of CM throughout her hospital stay.  Pt appears well, has no complaints and is aware that her transition plan of care is pending PT/OT [place]. Patient lives in a private house with daughter on the second floor. Patient states she is ind in adls with rollator before suregery.    Patient reports she has a rollator, hector and rolling walker  at home.    Patient identified her daughter Светлана Holland [phone] who will be her caregiver and will transport her home when cleared by the treatment team to transition home.    Transition care folder w/ list of certified hc agencies provided to the patient/ pt chose [facility] home.  Referral will be sent accordingly.   HC expectations,/ medicare guidelines, criteria explained to the patient w/ good understanding. Anticipated dc 3/16/2023 with soc 3/17/2023.  Patient provided with info on the transitional care management/health solutions team who will be following her upon DC.    CM contact info provided to the pt.  CM will remain available.   PCP: DAVID LUCAS   [facility]/Home Care/Outpatient Center/Clinic

## 2023-03-16 NOTE — PROGRESS NOTE ADULT - SUBJECTIVE AND OBJECTIVE BOX
Patient is a 78y old  Female who presents with a chief complaint of Left TKR for severe left knee OA.  (15 Mar 2023 15:00)      INTERVAL HPI/OVERNIGHT EVENTS:  Patient seen and examined in am, feels well,  pain is well controlled. Awaiting PT eval.  Denies dizziness fever, chills, nausea, vomiting. No pain on nose    ROS reviewed and is otherwise negative        Vital Signs Last 24 Hrs  T(C): 36.5 (16 Mar 2023 08:38), Max: 36.7 (15 Mar 2023 20:05)  T(F): 97.7 (16 Mar 2023 08:38), Max: 98 (15 Mar 2023 20:05)  HR: 80 (16 Mar 2023 09:05) (52 - 98)  BP: 137/72 (16 Mar 2023 09:05) (107/69 - 183/70)  BP(mean): --  RR: 15 (16 Mar 2023 09:05) (11 - 22)  SpO2: 98% (16 Mar 2023 09:05) (93% - 100%)    Parameters below as of 16 Mar 2023 03:30  Patient On (Oxygen Delivery Method): room air        PHYSICAL EXAM:  CONSTITUTIONAL: Well-developed; well-nourished; in no acute distress.  SKIN: Skin exam is warm and dry, no acute rash.  HEAD: Normocephalic; atraumatic.  EYES: PERRL, EOM intact; conjunctiva and sclera clear.  ENT: No nasal discharge; airway clear. TMs clear.  NECK: Supple; non tender.  CARD: S1, S2 normal; no murmurs, gallops, or rubs. Regular rate and rhythm.  RESP: No wheezes, rales or rhonchi.  ABD: Normal bowel sounds; soft; non-distended; non-tender; no hepatosplenomegaly.  EXT: left knee c/d/i, pulses +  LYMPH: No acute cervical adenopathy.  NEURO: Alert, oriented. Grossly unremarkable. No focal deficits.  PSYCH: Cooperative, appropriate.     MEDICATIONS  (STANDING):  acetaminophen     Tablet .. 1000 milliGRAM(s) Oral every 8 hours  allopurinol 100 milliGRAM(s) Oral daily  apixaban 2.5 milliGRAM(s) Oral every 12 hours  atorvastatin 20 milliGRAM(s) Oral at bedtime  carvedilol 12.5 milliGRAM(s) Oral every 12 hours  dextrose 5%. 1000 milliLiter(s) (50 mL/Hr) IV Continuous <Continuous>  dextrose 5%. 1000 milliLiter(s) (100 mL/Hr) IV Continuous <Continuous>  dextrose 50% Injectable 25 Gram(s) IV Push once  dextrose 50% Injectable 12.5 Gram(s) IV Push once  dextrose 50% Injectable 25 Gram(s) IV Push once  dextrose 50% Injectable 25 Gram(s) IV Push once  dextrose 50% Injectable 12.5 Gram(s) IV Push once  dextrose 50% Injectable 25 Gram(s) IV Push once  dextrose Oral Gel 15 Gram(s) Oral once  glucagon  Injectable 1 milliGRAM(s) IntraMuscular once  glucagon  Injectable 1 milliGRAM(s) IntraMuscular once  insulin lispro (ADMELOG) corrective regimen sliding scale   SubCutaneous three times a day before meals  insulin lispro (ADMELOG) corrective regimen sliding scale   SubCutaneous at bedtime  lactated ringers. 1000 milliLiter(s) (100 mL/Hr) IV Continuous <Continuous>  pantoprazole    Tablet 40 milliGRAM(s) Oral before breakfast  polyethylene glycol 3350 17 Gram(s) Oral at bedtime  senna 2 Tablet(s) Oral at bedtime    MEDICATIONS  (PRN):  dextrose Oral Gel 15 Gram(s) Oral once PRN Blood Glucose LESS THAN 70 milliGRAM(s)/deciliter  HYDROmorphone  Injectable 0.5 milliGRAM(s) IV Push every 3 hours PRN Breakthrough pain  magnesium hydroxide Suspension 30 milliLiter(s) Oral daily PRN Constipation  ondansetron Injectable 4 milliGRAM(s) IV Push every 6 hours PRN Nausea and/or Vomiting  oxyCODONE    IR 5 milliGRAM(s) Oral every 3 hours PRN Moderate Pain (4 - 6)  oxyCODONE    IR 10 milliGRAM(s) Oral every 3 hours PRN Severe Pain (7 - 10)      Allergies    Avelox (Rash)  penicillin (Rash)    Intolerances          LABS:                        11.5   13.22 )-----------( 197      ( 16 Mar 2023 06:00 )             33.9     16 Mar 2023 06:00    134    |  102    |  32     ----------------------------<  241    4.7     |  22     |  1.64     Ca    8.3        16 Mar 2023 06:00          CAPILLARY BLOOD GLUCOSE      POCT Blood Glucose.: 278 mg/dL (15 Mar 2023 21:43)      RADIOLOGY & ADDITIONAL TESTS:        Care Discussed with Consultants/Other Providers:    Advanced Directives: [ ] DNR  [ ] No feeding tube  [ ] MOLST in chart  [ ] MOLST completed today  [ ] Unknown  
Post Op Day #1    SUBJECTIVE    79yo Female status post left TKR .   Patient is alert and comfortable.    Pain is controlled with current pain regimen.  Denies nausea, vomiting, chest pain, shortness of breath, abdominal pain or fever.   No new complaints.    OBJECTIVE    Vital Signs Last 24 Hrs  T(C): 36.5 (16 Mar 2023 08:38), Max: 36.7 (15 Mar 2023 20:05)  T(F): 97.7 (16 Mar 2023 08:38), Max: 98 (15 Mar 2023 20:05)  HR: 80 (16 Mar 2023 09:05) (52 - 98)  BP: 137/72 (16 Mar 2023 09:05) (107/69 - 183/70)  BP(mean): --  RR: 15 (16 Mar 2023 09:05) (11 - 22)  SpO2: 98% (16 Mar 2023 09:05) (93% - 100%)    Parameters below as of 16 Mar 2023 03:30  Patient On (Oxygen Delivery Method): room air      I&O's Summary    15 Mar 2023 07:01  -  16 Mar 2023 07:00  --------------------------------------------------------  IN: 3765 mL / OUT: 750 mL / NET: 3015 mL        Left knee ROBSON dressing is clean, dry and intact.   The calf is supple/nontender.   Sensation to light touch is grossly intact distally.   Motor function distally is intact.   No foot drop.   (2+) dorsalis pedis pulse. Capillary refill is less than 2 seconds. No cyanosis.                          11.5   13.22<H> )-----------( 197      ( 16 Mar 2023 06:00 )             33.9<L>  16 Mar 2023 06:00                        13.5   11.89<H> )-----------( 175      ( 15 Mar 2023 20:18 )             40.3   15 Mar 2023 20:18    16 Mar 2023 06:00    134<L>  |  102    |  32<H>  ----------------------------<  241<H>  4.7     |  22     |  1.64<H>  15 Mar 2023 20:18    135    |  101    |  29<H>  ----------------------------<  285<H>  5.4<H>   |  24     |  1.62<H>    Ca    8.3<L>      16 Mar 2023 06:00  Ca    8.3<L>      15 Mar 2023 20:18        ASSESSMENT AND PLAN    - Orthopedically stable  - CKD, hold NSAIDs  - DVT prophylaxis: PAS + Eliquis 2.5mg twice daily  - Continue physical therapy and occupational therapy  - Weight bearing as tolerated of the left lower extremity with assistance of a walker  - Incentive spirometry encouraged  - Pain control as clinically indicated  - Disposition:  Home when cleared by medicine and PT/OT       
Discharge medication calendar:  Eliquis 2.5mg q12h x 2 weeks then ASA EC 81mg q12h x 2 weeks  APAP 1000mg q8h x 2-3 weeks  No NSAID (CKD)  Omeprazole 20mg QAM x 4 weeks  Narcotic PRN  Docusate 100mg TID while taking narcotic  Miralax, Senna, or Bisacodyl PRN for treatment of constipation

## 2023-03-16 NOTE — CARE COORDINATION ASSESSMENT. - NSCAREPROVIDERS_GEN_ALL_CORE_FT
CARE PROVIDERS:  Administration: Aida Landry  Administration: Hayley Umaña  Admitting: Alex Wolf  Attending: Alex Wolf  Consultant: Weil, Patricia  Consultant: Gloria Zendejas  Consultant: Yosvany Tillman  Nurse: Yaquelin Macias  Nurse: Colleen Nava  Nurse: Lizzie Allen  Nurse: Molly Wood  Override: Dominga Rao  Override: Yaquelin Macias  PCA/Nursing Assistant: Lisa Naranjo  Physical Therapy: López Bar  Physical Therapy: Gloria Braga  Primary Team: Juan Nina  Primary Team: August Guerra  Primary Team: Franki White  Primary Team: Alonso Byrnes  Primary Team: Madelin Vanegas  Primary Team: Jean Diana  Primary Team: Skyler Morse  Registered Dietitian: Anju Berger  Team: [facility] Hospitalists, Team

## 2023-03-16 NOTE — DISCHARGE NOTE NURSING/CASE MANAGEMENT/SOCIAL WORK - NSDCFUADDAPPT_GEN_ALL_CORE_FT
It is advisable to follow up with your primary care provider within the next 2-3 weeks to ensure your medications are appropriate and there are no underlying problems after your procedure.     Your 2 week Post-Op appointment is with Elvia Morris NP at [address] [place] [address] on March 30th. Call for confirmation or questions

## 2023-03-16 NOTE — PROGRESS NOTE ADULT - ASSESSMENT
77 yo female, pmh of htn, DM, OA, obesity, presenting for left knee TKR    s/p left knee tkr  - pain management, dvt ppx as per ortho  - PT/OT  - no medical contraindication to DC.    HTN - can resume home BP meds on DC    DM- BS elevated, likely due to steroids, can restart home glipizide on DC  monitor FS

## 2023-03-16 NOTE — DISCHARGE NOTE NURSING/CASE MANAGEMENT/SOCIAL WORK - NSDCPEFALRISK_GEN_ALL_CORE
For information on Fall & Injury Prevention, visit: [email or web address] OR  [email or web address] OR  https://www.cdc.gov/steadi/patient.html

## 2023-03-16 NOTE — PHARMACOTHERAPY INTERVENTION NOTE - COMMENTS
Transition of Care video discharge education - medication calendar given to patient  
Patient tolerated cefazolin perioperatively despite reported allergy to penicillin. Profile updated. 
Admission medication reconciliation POD1

## 2023-03-16 NOTE — DISCHARGE NOTE NURSING/CASE MANAGEMENT/SOCIAL WORK - NSSCNAMETXT_GEN_ALL_CORE
[facility] care agency [phone] will reach out to you within 24-72 hours of your discharge to schedule home care visit/eval appointment with you. Please call agency for any queries regarding home care services

## 2023-03-16 NOTE — DISCHARGE NOTE NURSING/CASE MANAGEMENT/SOCIAL WORK - PATIENT PORTAL LINK FT
You can access the FollowMyHealth Patient Portal offered by [facility] by registering at the following website: [email or web address]. By joining [employer]’s FollowMyHealth portal, you will also be able to view your health information using other applications (apps) compatible with our system.

## 2023-03-16 NOTE — CARE COORDINATION ASSESSMENT. - NSPASTMEDSURGHISTORY_GEN_ALL_CORE_FT
PAST MEDICAL & SURGICAL HISTORY:  Morbid obesity with BMI of 40.0-44.9, adult      Type II diabetes mellitus      Osteoarthritis of left knee      Uric acid kidney stone      HLD (hyperlipidemia)      HTN (hypertension)      S/P cataract surgery      S/P tubal ligation      S/P cholecystectomy      S/P arthroscopy of left shoulder      S/P cystoscopy

## 2023-03-21 ENCOUNTER — TRANSCRIPTION ENCOUNTER (OUTPATIENT)
Age: 78
End: 2023-03-21

## 2023-03-30 ENCOUNTER — APPOINTMENT (OUTPATIENT)
Dept: ORTHOPEDIC SURGERY | Facility: CLINIC | Age: 78
End: 2023-03-30
Payer: MEDICARE

## 2023-03-30 VITALS — SYSTOLIC BLOOD PRESSURE: 115 MMHG | HEART RATE: 71 BPM | DIASTOLIC BLOOD PRESSURE: 77 MMHG | TEMPERATURE: 98.1 F

## 2023-03-30 PROCEDURE — 99024 POSTOP FOLLOW-UP VISIT: CPT

## 2023-03-30 PROCEDURE — 73562 X-RAY EXAM OF KNEE 3: CPT | Mod: LT

## 2023-03-30 NOTE — HISTORY OF PRESENT ILLNESS
[___ Weeks Post Op] : [unfilled] weeks post op [2] : the patient reports pain that is 2/10 in severity [Chills] : no chills [Constipation] : no constipation [Diarrhea] : no diarrhea [Dysuria] : no dysuria [Fever] : no fever [Nausea] : no nausea [Vomiting] : no vomiting [Clean/Dry/Intact] : clean, dry and intact [Erythema] : not erythematous [Discharge] : absent of discharge [Swelling] : not swollen [Dehiscence] : not dehisced [Neuro Intact] : an unremarkable neurological exam [Vascular Intact] : ~T peripheral vascular exam normal [Xray (Date:___)] : [unfilled] Xray -  [Hardware in Good Position] : hardware in good position [No Obvious Fractures] : no obvious fractures [Good Overall Alignment] : good overall alignment [Doing Well] : is doing well [No Sign of Infection] : is showing no signs of infection [Adequate Pain Control] : has adequate pain control [de-identified] : Left total knee replacement 3/15/23\par The patient is a 78year old female S/P left total knee replacement performed at [facility] on 03/15/2023. Patient presents today for her first post operative visit and Dermabond tape removal. [de-identified] : The patient was discharged from the hospital with home physical therapy and home exercises. She verbalized feeling well overall. The patient reports pain of 2/10, mostly at nighttime. She is taking Tylenol as needed for pain relief. Patient is using EC. Aspirin 81 MG twice a day for DVT prophylaxis after completing eliquis for 2 weeks. The patient is not using  Celebrex due to her kidney disease, advised to use ICE for better control of inflammation. [de-identified] : The patient has mild antalgic gait utilizing a cane. Patient is S/P left total knee replacement. The knee is with Dermabond tape intact. The surgical incision site is without redness, heat or drainage. No palpable hematoma or effusion. No calf tenderness. Positive distal pulses. The active ROM of the left knee is from 0-110 degrees. No evidence of ligament laxity, muscle atrophy, motor or sensory deficit. No flexion contracture or extension lag noted. The left lower extremity is with mild swelling. There is no evidence of infection or cellulitis on the incision site. [de-identified] : 3 views of the left knee were obtained at today's visit. X-rays reveal a well-positioned, well fixed total knee replacement in good alignment. There is no obvious evidence of fractures, dislocation or osteolysis. [de-identified] : \par s/p left knee replacement\par \par \par \par  [de-identified] : Dermabond was removed from the left knee and replaced with Steri-Strips. Patient tolerated it well. Incisional care was reviewed with the patient. Patient was advised on the nature of the healing process and on the importance of adhering to the DVT prophylaxis with Aspirin 81 MG BID for a total of 4 weeks post operative. Patient to continue with physical therapy and home exercises as recommended. Prescription was given for outpatient physical therapy. Patient was encouraged to engage in isometric exercises to assist the knee to come to full extension. She was advised to continue to apply ice to the knee and keep the left lower extremity elevated above the level of the heart to decrease swelling. Prescription was given for antibiotics clindamycin for dental prophylaxis as per Dr. Wolf's protocol. She will continue to utilize Tylenol as needed for pain relief and ICE machine to decrease inflammation. Patient to make a follow up appointment to see Dr. Wolf in 6 weeks. She was educated on the signs and symptoms of infection to report. Patient verbalized understanding of all instructions and all of her questions were addressed to her satisfaction. Patient was advised to call this office if she has new questions or concerns.

## 2023-04-11 ENCOUNTER — TRANSCRIPTION ENCOUNTER (OUTPATIENT)
Age: 78
End: 2023-04-11

## 2023-05-02 ENCOUNTER — APPOINTMENT (OUTPATIENT)
Dept: ORTHOPEDIC SURGERY | Facility: CLINIC | Age: 78
End: 2023-05-02
Payer: MEDICARE

## 2023-05-02 VITALS
BODY MASS INDEX: 41.23 KG/M2 | HEART RATE: 70 BPM | HEIGHT: 60 IN | WEIGHT: 210 LBS | SYSTOLIC BLOOD PRESSURE: 123 MMHG | DIASTOLIC BLOOD PRESSURE: 77 MMHG

## 2023-05-02 DIAGNOSIS — Z96.652 PRESENCE OF LEFT ARTIFICIAL KNEE JOINT: ICD-10-CM

## 2023-05-02 PROCEDURE — 99024 POSTOP FOLLOW-UP VISIT: CPT

## 2023-05-02 PROCEDURE — 73562 X-RAY EXAM OF KNEE 3: CPT | Mod: LT

## 2023-05-02 RX ORDER — CLINDAMYCIN HYDROCHLORIDE 300 MG/1
300 CAPSULE ORAL
Qty: 10 | Refills: 0 | Status: ACTIVE | COMMUNITY
Start: 2023-05-02 | End: 1900-01-01

## 2023-05-02 NOTE — END OF VISIT
[FreeTextEntry3] : I, Dr. Alex Wolf MD, personally performed the evaluation and management services for this established patient who presented today with a new problem/exacerbation of an existing condition. That E/M includes conducting the examination, assessing all new/exacerbated conditions, and establishing a new plan of care. Today, MY ACP was here to assist with recording the history in my evaluation and management services of this new problem/exacerbated condition to be followed going forward.\par

## 2023-05-02 NOTE — HISTORY OF PRESENT ILLNESS
[___ Weeks Post Op] : [unfilled] weeks post op [0] : no pain reported [Doing Well] : is doing well [Clean/Dry/Intact] : clean, dry and intact [Healed] : healed [Neuro Intact] : an unremarkable neurological exam [Vascular Intact] : ~T peripheral vascular exam normal [Negative Elle's] : maneuvers demonstrated a negative Elle's sign [Xray (Date:___)] : [unfilled] Xray -  [Chills] : no chills [Fever] : no fever [Erythema] : not erythematous [Discharge] : absent of discharge [Swelling] : not swollen [Dehiscence] : not dehisced [de-identified] : S/P Left TKR DOS 3/15/23 [de-identified] : 78-year-old female presents for follow-up of the left knee status post left total knee replacement on 3/15/2023.  Overall in the postoperative period patient is doing very well.  She states that she is in no pain however she does experience some discomfort with weather changes.  She has some numbness to the lateral knee.  There is some tenderness to palpation in the knee as well.  She is doing physical therapy 3 times per week with improvement in symptoms.  She states that overall since the surgery her symptoms have improved about 70%.  She is using a rollator to help with her ambulation due to chronic low back pain.\par Denies any fevers chills chest pain calf pain shortness of breath [de-identified] : On physical exam of the left knee skin is warm and dry without erythema ecchymosis signs or symptoms of infection superficial or deep. There is a well healed surgical incision. There is no tenderness to palpation throughout the knee joint.  There is no palpable effusion.  Range of motion is 0-120 without pain or stiffness.  The knee is stable with varus and valgus stress.  There is a negative anterior posterior drawer.  Sensation is intact throughout the distal lower extremity.  Strength is well-maintained in all planes.  There is negative Homans exam.  2+ dorsalis pedis pulse.  [de-identified] : Radiographs of the left knee were performed today. There are stable interfaces between bone, cement, and implant in all 3 components. There is stable positioning of the femoral, tibial, and patellar components. There is equidistant spacing on each side of the tibial bearing. There is no fracture, foreign body, subsidement, osteolysis, or notable effusion. The patellar component is tracking centrally in the trochlear groove of the femoral component.  [de-identified] : Dr. Wolf evaluated this patient, reviewed the radiographs, and is guiding the patients orthopedic management.\par The patient was advised to continue their routine activities of daily living within tolerances, home exercises as guided by PT, and continue outpatient PT until goals are achieved. \par Instructions for LE strengthening, ROM, and balance exercises were reviewed.\par The patient was advised to follow up with their PCP, if not done already, for a post op medical reevaluation including lab work. \par The patient was advised to continue with regular orthopedic follow up post TKR and may come to our office if any new problems arise for urgent orthopedic reevaluation. \par All patients questions and concerns were addressed to satisfaction.\par Advised the patient to continue with antibiotics prior to dental procedures as per Dr. Wolf's protocol. \par In regards to the chronic low back pain advised patient to follow-up with Dr. Randal Underwood for further evaluation and management.\par A prescription for clindamycin was sent to the patient's pharmacy to continue to take prior to dental procedures.

## 2023-05-04 ENCOUNTER — TRANSCRIPTION ENCOUNTER (OUTPATIENT)
Age: 78
End: 2023-05-04

## 2023-06-13 ENCOUNTER — TRANSCRIPTION ENCOUNTER (OUTPATIENT)
Age: 78
End: 2023-06-13

## 2024-08-19 NOTE — DISCHARGE NOTE NURSING/CASE MANAGEMENT/SOCIAL WORK - FLU SEASON?

## 2024-11-26 NOTE — H&P PST ADULT - BIRTH SEX
Anesthesia Post Evaluation    Patient: Navin Henning JrDouglas    Procedure(s) Performed: Procedure(s) (LRB):  EGD (ESOPHAGOGASTRODUODENOSCOPY) (N/A)    Final Anesthesia Type: general      Patient location during evaluation: GI PACU  Patient participation: Yes- Able to Participate  Level of consciousness: awake and alert  Post-procedure vital signs: reviewed and stable  Pain management: adequate  Airway patency: patent    PONV status at discharge: No PONV  Anesthetic complications: no      Cardiovascular status: blood pressure returned to baseline and hemodynamically stable  Respiratory status: unassisted  Hydration status: euvolemic  Follow-up not needed.              Vitals Value Taken Time   /74 11/26/24 1220   Temp 36.5 °C (97.7 °F) 11/26/24 1150   Pulse 54 11/26/24 1220   Resp 10 11/26/24 1220   SpO2 98 % 11/26/24 1220         Event Time   Out of Recovery 12:25:24         Pain/Alayna Score: No data recorded        
Female

## 2025-04-15 ENCOUNTER — APPOINTMENT (OUTPATIENT)
Dept: ORTHOPEDIC SURGERY | Facility: CLINIC | Age: 80
End: 2025-04-15
Payer: MEDICARE

## 2025-04-15 DIAGNOSIS — M25.551 PAIN IN RIGHT HIP: ICD-10-CM

## 2025-04-15 DIAGNOSIS — M17.11 UNILATERAL PRIMARY OSTEOARTHRITIS, RIGHT KNEE: ICD-10-CM

## 2025-04-15 PROCEDURE — 99214 OFFICE O/P EST MOD 30 MIN: CPT

## 2025-04-15 PROCEDURE — 73562 X-RAY EXAM OF KNEE 3: CPT | Mod: RT

## 2025-04-22 ENCOUNTER — NON-APPOINTMENT (OUTPATIENT)
Age: 80
End: 2025-04-22

## 2025-05-01 ENCOUNTER — OUTPATIENT (OUTPATIENT)
Dept: OUTPATIENT SERVICES | Facility: HOSPITAL | Age: 80
LOS: 1 days | End: 2025-05-01
Payer: MEDICARE

## 2025-05-01 VITALS
RESPIRATION RATE: 15 BRPM | WEIGHT: 242.95 LBS | OXYGEN SATURATION: 97 % | HEIGHT: 61 IN | DIASTOLIC BLOOD PRESSURE: 83 MMHG | SYSTOLIC BLOOD PRESSURE: 148 MMHG | TEMPERATURE: 98 F | HEART RATE: 65 BPM

## 2025-05-01 DIAGNOSIS — Z98.49 CATARACT EXTRACTION STATUS, UNSPECIFIED EYE: Chronic | ICD-10-CM

## 2025-05-01 DIAGNOSIS — M17.11 UNILATERAL PRIMARY OSTEOARTHRITIS, RIGHT KNEE: ICD-10-CM

## 2025-05-01 DIAGNOSIS — Z96.652 PRESENCE OF LEFT ARTIFICIAL KNEE JOINT: Chronic | ICD-10-CM

## 2025-05-01 DIAGNOSIS — Z98.890 OTHER SPECIFIED POSTPROCEDURAL STATES: Chronic | ICD-10-CM

## 2025-05-01 DIAGNOSIS — Z90.49 ACQUIRED ABSENCE OF OTHER SPECIFIED PARTS OF DIGESTIVE TRACT: Chronic | ICD-10-CM

## 2025-05-01 DIAGNOSIS — Z01.818 ENCOUNTER FOR OTHER PREPROCEDURAL EXAMINATION: ICD-10-CM

## 2025-05-01 DIAGNOSIS — Z98.51 TUBAL LIGATION STATUS: Chronic | ICD-10-CM

## 2025-05-01 PROBLEM — M17.12 UNILATERAL PRIMARY OSTEOARTHRITIS, LEFT KNEE: Chronic | Status: INACTIVE | Noted: 2023-02-24 | Resolved: 2025-05-01

## 2025-05-01 PROBLEM — E66.01 MORBID (SEVERE) OBESITY DUE TO EXCESS CALORIES: Chronic | Status: INACTIVE | Noted: 2023-02-24 | Resolved: 2025-05-01

## 2025-05-01 LAB
A1C WITH ESTIMATED AVERAGE GLUCOSE RESULT: 7.8 % — HIGH (ref 4–5.6)
ALBUMIN SERPL ELPH-MCNC: 3.4 G/DL — SIGNIFICANT CHANGE UP (ref 3.3–5)
ALP SERPL-CCNC: 91 U/L — SIGNIFICANT CHANGE UP (ref 30–120)
ALT FLD-CCNC: 17 U/L — SIGNIFICANT CHANGE UP (ref 10–60)
ANION GAP SERPL CALC-SCNC: 8 MMOL/L — SIGNIFICANT CHANGE UP (ref 5–17)
APTT BLD: 27.9 SEC — SIGNIFICANT CHANGE UP (ref 26.1–36.8)
AST SERPL-CCNC: 14 U/L — SIGNIFICANT CHANGE UP (ref 10–40)
BILIRUB SERPL-MCNC: 0.6 MG/DL — SIGNIFICANT CHANGE UP (ref 0.2–1.2)
BLD GP AB SCN SERPL QL: SIGNIFICANT CHANGE UP
BUN SERPL-MCNC: 23 MG/DL — SIGNIFICANT CHANGE UP (ref 7–23)
CALCIUM SERPL-MCNC: 9 MG/DL — SIGNIFICANT CHANGE UP (ref 8.4–10.5)
CHLORIDE SERPL-SCNC: 101 MMOL/L — SIGNIFICANT CHANGE UP (ref 96–108)
CO2 SERPL-SCNC: 28 MMOL/L — SIGNIFICANT CHANGE UP (ref 22–31)
CREAT SERPL-MCNC: 1.53 MG/DL — HIGH (ref 0.5–1.3)
EGFR: 34 ML/MIN/1.73M2 — LOW
EGFR: 34 ML/MIN/1.73M2 — LOW
ESTIMATED AVERAGE GLUCOSE: 177 MG/DL — HIGH (ref 68–114)
GLUCOSE SERPL-MCNC: 196 MG/DL — HIGH (ref 70–99)
HCT VFR BLD CALC: 37.9 % — SIGNIFICANT CHANGE UP (ref 34.5–45)
HGB BLD-MCNC: 11.9 G/DL — SIGNIFICANT CHANGE UP (ref 11.5–15.5)
INR BLD: 1.06 RATIO — SIGNIFICANT CHANGE UP (ref 0.85–1.16)
MCHC RBC-ENTMCNC: 27.5 PG — SIGNIFICANT CHANGE UP (ref 27–34)
MCHC RBC-ENTMCNC: 31.4 G/DL — LOW (ref 32–36)
MCV RBC AUTO: 87.7 FL — SIGNIFICANT CHANGE UP (ref 80–100)
MRSA PCR RESULT.: SIGNIFICANT CHANGE UP
NRBC BLD AUTO-RTO: 0 /100 WBCS — SIGNIFICANT CHANGE UP (ref 0–0)
PLATELET # BLD AUTO: 254 K/UL — SIGNIFICANT CHANGE UP (ref 150–400)
POTASSIUM SERPL-MCNC: 4.7 MMOL/L — SIGNIFICANT CHANGE UP (ref 3.5–5.3)
POTASSIUM SERPL-SCNC: 4.7 MMOL/L — SIGNIFICANT CHANGE UP (ref 3.5–5.3)
PROT SERPL-MCNC: 7.3 G/DL — SIGNIFICANT CHANGE UP (ref 6–8.3)
PROTHROM AB SERPL-ACNC: 12.5 SEC — SIGNIFICANT CHANGE UP (ref 9.9–13.4)
RBC # BLD: 4.32 M/UL — SIGNIFICANT CHANGE UP (ref 3.8–5.2)
RBC # FLD: 14.2 % — SIGNIFICANT CHANGE UP (ref 10.3–14.5)
S AUREUS DNA NOSE QL NAA+PROBE: DETECTED
SODIUM SERPL-SCNC: 137 MMOL/L — SIGNIFICANT CHANGE UP (ref 135–145)
WBC # BLD: 7.88 K/UL — SIGNIFICANT CHANGE UP (ref 3.8–10.5)
WBC # FLD AUTO: 7.88 K/UL — SIGNIFICANT CHANGE UP (ref 3.8–10.5)

## 2025-05-01 PROCEDURE — 87640 STAPH A DNA AMP PROBE: CPT

## 2025-05-01 PROCEDURE — 85610 PROTHROMBIN TIME: CPT

## 2025-05-01 PROCEDURE — G0463: CPT

## 2025-05-01 PROCEDURE — 87641 MR-STAPH DNA AMP PROBE: CPT

## 2025-05-01 PROCEDURE — 83036 HEMOGLOBIN GLYCOSYLATED A1C: CPT

## 2025-05-01 PROCEDURE — 36415 COLL VENOUS BLD VENIPUNCTURE: CPT

## 2025-05-01 PROCEDURE — 80053 COMPREHEN METABOLIC PANEL: CPT

## 2025-05-01 PROCEDURE — 86900 BLOOD TYPING SEROLOGIC ABO: CPT

## 2025-05-01 PROCEDURE — 86850 RBC ANTIBODY SCREEN: CPT

## 2025-05-01 PROCEDURE — 85027 COMPLETE CBC AUTOMATED: CPT

## 2025-05-01 PROCEDURE — 85730 THROMBOPLASTIN TIME PARTIAL: CPT

## 2025-05-01 PROCEDURE — 86901 BLOOD TYPING SEROLOGIC RH(D): CPT

## 2025-05-01 RX ORDER — DEXTROSE 50 % IN WATER 50 %
12.5 SYRINGE (ML) INTRAVENOUS ONCE
Refills: 0 | Status: DISCONTINUED | OUTPATIENT
Start: 2025-05-19 | End: 2025-05-20

## 2025-05-01 RX ORDER — DEXTROSE 50 % IN WATER 50 %
25 SYRINGE (ML) INTRAVENOUS ONCE
Refills: 0 | Status: DISCONTINUED | OUTPATIENT
Start: 2025-05-19 | End: 2025-05-20

## 2025-05-01 RX ORDER — SODIUM CHLORIDE 9 G/1000ML
1000 INJECTION, SOLUTION INTRAVENOUS
Refills: 0 | Status: DISCONTINUED | OUTPATIENT
Start: 2025-05-19 | End: 2025-05-20

## 2025-05-01 RX ORDER — DEXTROSE 50 % IN WATER 50 %
15 SYRINGE (ML) INTRAVENOUS ONCE
Refills: 0 | Status: DISCONTINUED | OUTPATIENT
Start: 2025-05-19 | End: 2025-05-20

## 2025-05-01 RX ORDER — GLUCAGON 3 MG/1
1 POWDER NASAL ONCE
Refills: 0 | Status: DISCONTINUED | OUTPATIENT
Start: 2025-05-19 | End: 2025-05-20

## 2025-05-01 NOTE — H&P PST ADULT - HISTORY OF PRESENT ILLNESS
79 yo female reports longstanding history of right knee pain rating 10/10 at worst.  She is scheduled for right total knee replacement on 5/19/2025

## 2025-05-01 NOTE — H&P PST ADULT - NSICDXPASTSURGICALHX_GEN_ALL_CORE_FT
PAST SURGICAL HISTORY:  History of left knee replacement     S/P arthroscopy of left shoulder     S/P cataract surgery     S/P cholecystectomy     S/P cystoscopy     S/P tubal ligation

## 2025-05-01 NOTE — H&P PST ADULT - NSICDXPASTMEDICALHX_GEN_ALL_CORE_FT
PAST MEDICAL HISTORY:  Anxiety     At risk for sleep apnea     BMI 45.0-49.9, adult     HLD (hyperlipidemia)     HTN (hypertension)     Osteoarthritis of right knee     Type II diabetes mellitus     Uric acid kidney stone

## 2025-05-02 RX ORDER — MUPIROCIN CALCIUM 20 MG/G
1 CREAM TOPICAL
Qty: 1 | Refills: 0
Start: 2025-05-02 | End: 2025-05-06

## 2025-05-02 NOTE — PROGRESS NOTE ADULT - SUBJECTIVE AND OBJECTIVE BOX
Nasal culture result: staph aureus detected  MUpirocin escribed  Spoke to patient; understands treatment

## 2025-05-14 RX ORDER — TRANEXAMIC ACID 1000 MG/10
1000 AMPUL (ML) INTRAVENOUS ONCE
Refills: 0 | Status: COMPLETED | OUTPATIENT
Start: 2025-05-19 | End: 2025-05-19

## 2025-05-14 RX ORDER — ACETAMINOPHEN 500 MG/5ML
1000 LIQUID (ML) ORAL ONCE
Refills: 0 | Status: COMPLETED | OUTPATIENT
Start: 2025-05-19 | End: 2025-05-19

## 2025-05-14 RX ORDER — CEFAZOLIN SODIUM IN 0.9 % NACL 3 G/100 ML
2000 INTRAVENOUS SOLUTION, PIGGYBACK (ML) INTRAVENOUS ONCE
Refills: 0 | Status: COMPLETED | OUTPATIENT
Start: 2025-05-19 | End: 2025-05-19

## 2025-05-15 PROBLEM — M17.11 UNILATERAL PRIMARY OSTEOARTHRITIS, RIGHT KNEE: Chronic | Status: ACTIVE | Noted: 2025-05-01

## 2025-05-15 PROBLEM — Z91.89 OTHER SPECIFIED PERSONAL RISK FACTORS, NOT ELSEWHERE CLASSIFIED: Chronic | Status: ACTIVE | Noted: 2025-05-01

## 2025-05-19 ENCOUNTER — APPOINTMENT (OUTPATIENT)
Dept: ORTHOPEDIC SURGERY | Facility: HOSPITAL | Age: 80
End: 2025-05-19

## 2025-05-19 ENCOUNTER — INPATIENT (INPATIENT)
Facility: HOSPITAL | Age: 80
LOS: 0 days | Discharge: HOME | DRG: 554 | End: 2025-05-20
Attending: ORTHOPAEDIC SURGERY | Admitting: ORTHOPAEDIC SURGERY
Payer: MEDICARE

## 2025-05-19 VITALS
TEMPERATURE: 98 F | HEART RATE: 70 BPM | RESPIRATION RATE: 18 BRPM | HEIGHT: 61 IN | SYSTOLIC BLOOD PRESSURE: 152 MMHG | DIASTOLIC BLOOD PRESSURE: 75 MMHG | WEIGHT: 250.89 LBS

## 2025-05-19 DIAGNOSIS — Z96.652 PRESENCE OF LEFT ARTIFICIAL KNEE JOINT: Chronic | ICD-10-CM

## 2025-05-19 DIAGNOSIS — Z90.49 ACQUIRED ABSENCE OF OTHER SPECIFIED PARTS OF DIGESTIVE TRACT: Chronic | ICD-10-CM

## 2025-05-19 DIAGNOSIS — Z98.890 OTHER SPECIFIED POSTPROCEDURAL STATES: Chronic | ICD-10-CM

## 2025-05-19 DIAGNOSIS — Z98.51 TUBAL LIGATION STATUS: Chronic | ICD-10-CM

## 2025-05-19 DIAGNOSIS — M17.11 UNILATERAL PRIMARY OSTEOARTHRITIS, RIGHT KNEE: ICD-10-CM

## 2025-05-19 DIAGNOSIS — Z98.49 CATARACT EXTRACTION STATUS, UNSPECIFIED EYE: Chronic | ICD-10-CM

## 2025-05-19 DIAGNOSIS — Z01.818 ENCOUNTER FOR OTHER PREPROCEDURAL EXAMINATION: ICD-10-CM

## 2025-05-19 LAB
GLUCOSE BLDC GLUCOMTR-MCNC: 164 MG/DL — HIGH (ref 70–99)
GLUCOSE BLDC GLUCOMTR-MCNC: 176 MG/DL — HIGH (ref 70–99)
GLUCOSE BLDC GLUCOMTR-MCNC: 316 MG/DL — HIGH (ref 70–99)

## 2025-05-19 PROCEDURE — 73560 X-RAY EXAM OF KNEE 1 OR 2: CPT | Mod: 26,RT

## 2025-05-19 PROCEDURE — 27447 TOTAL KNEE ARTHROPLASTY: CPT | Mod: RT

## 2025-05-19 PROCEDURE — 99221 1ST HOSP IP/OBS SF/LOW 40: CPT

## 2025-05-19 DEVICE — BONE WAX 2.5GM: Type: IMPLANTABLE DEVICE | Site: RIGHT | Status: FUNCTIONAL

## 2025-05-19 DEVICE — STEM MOD UNIV TIB 10X40MM: Type: IMPLANTABLE DEVICE | Site: RIGHT | Status: FUNCTIONAL

## 2025-05-19 DEVICE — IMPLANTABLE DEVICE: Type: IMPLANTABLE DEVICE | Site: RIGHT | Status: FUNCTIONAL

## 2025-05-19 DEVICE — INSERT TIB NONPOROUS UNIV SZ 4 RT: Type: IMPLANTABLE DEVICE | Site: RIGHT | Status: FUNCTIONAL

## 2025-05-19 DEVICE — STEM MOD UNIV TIB 10X65MM: Type: IMPLANTABLE DEVICE | Site: RIGHT | Status: FUNCTIONAL

## 2025-05-19 DEVICE — KIT PREP BONE BIO-PREP: Type: IMPLANTABLE DEVICE | Site: RIGHT | Status: FUNCTIONAL

## 2025-05-19 DEVICE — COMP PATELLA TRI-PEG E-PLUS POLY 8X29MM: Type: IMPLANTABLE DEVICE | Site: RIGHT | Status: FUNCTIONAL

## 2025-05-19 RX ORDER — INSULIN LISPRO 100 U/ML
INJECTION, SOLUTION INTRAVENOUS; SUBCUTANEOUS
Refills: 0 | Status: DISCONTINUED | OUTPATIENT
Start: 2025-05-19 | End: 2025-05-20

## 2025-05-19 RX ORDER — APIXABAN 2.5 MG/1
1 TABLET, FILM COATED ORAL
Qty: 27 | Refills: 0
Start: 2025-05-19

## 2025-05-19 RX ORDER — ONDANSETRON HCL/PF 4 MG/2 ML
4 VIAL (ML) INJECTION ONCE
Refills: 0 | Status: DISCONTINUED | OUTPATIENT
Start: 2025-05-19 | End: 2025-05-19

## 2025-05-19 RX ORDER — SODIUM CHLORIDE 9 G/1000ML
1000 INJECTION, SOLUTION INTRAVENOUS
Refills: 0 | Status: DISCONTINUED | OUTPATIENT
Start: 2025-05-19 | End: 2025-05-20

## 2025-05-19 RX ORDER — OMEPRAZOLE 20 MG/1
1 CAPSULE, DELAYED RELEASE ORAL
Qty: 30 | Refills: 0
Start: 2025-05-19

## 2025-05-19 RX ORDER — DEXTROSE 50 % IN WATER 50 %
25 SYRINGE (ML) INTRAVENOUS ONCE
Refills: 0 | Status: DISCONTINUED | OUTPATIENT
Start: 2025-05-19 | End: 2025-05-20

## 2025-05-19 RX ORDER — ASPIRIN 325 MG
1 TABLET ORAL
Qty: 28 | Refills: 0
Start: 2025-05-19

## 2025-05-19 RX ORDER — ACETAMINOPHEN 500 MG/5ML
1000 LIQUID (ML) ORAL EVERY 8 HOURS
Refills: 0 | Status: DISCONTINUED | OUTPATIENT
Start: 2025-05-20 | End: 2025-05-20

## 2025-05-19 RX ORDER — CARVEDILOL 3.12 MG/1
12.5 TABLET, FILM COATED ORAL EVERY 12 HOURS
Refills: 0 | Status: DISCONTINUED | OUTPATIENT
Start: 2025-05-19 | End: 2025-05-20

## 2025-05-19 RX ORDER — SERTRALINE 100 MG/1
50 TABLET, FILM COATED ORAL DAILY
Refills: 0 | Status: DISCONTINUED | OUTPATIENT
Start: 2025-05-19 | End: 2025-05-20

## 2025-05-19 RX ORDER — SODIUM CHLORIDE 9 G/1000ML
1000 INJECTION, SOLUTION INTRAVENOUS
Refills: 0 | Status: DISCONTINUED | OUTPATIENT
Start: 2025-05-19 | End: 2025-05-19

## 2025-05-19 RX ORDER — POLYETHYLENE GLYCOL 3350 17 G/17G
17 POWDER, FOR SOLUTION ORAL AT BEDTIME
Refills: 0 | Status: DISCONTINUED | OUTPATIENT
Start: 2025-05-19 | End: 2025-05-20

## 2025-05-19 RX ORDER — CEFADROXIL 500 MG/1
500 CAPSULE ORAL EVERY 12 HOURS
Refills: 0 | Status: DISCONTINUED | OUTPATIENT
Start: 2025-05-20 | End: 2025-05-20

## 2025-05-19 RX ORDER — OXYCODONE HYDROCHLORIDE 30 MG/1
10 TABLET ORAL
Refills: 0 | Status: DISCONTINUED | OUTPATIENT
Start: 2025-05-19 | End: 2025-05-20

## 2025-05-19 RX ORDER — MAGNESIUM, ALUMINUM HYDROXIDE 200-200 MG
30 TABLET,CHEWABLE ORAL
Refills: 0 | Status: DISCONTINUED | OUTPATIENT
Start: 2025-05-19 | End: 2025-05-20

## 2025-05-19 RX ORDER — DEXTROSE 50 % IN WATER 50 %
15 SYRINGE (ML) INTRAVENOUS ONCE
Refills: 0 | Status: DISCONTINUED | OUTPATIENT
Start: 2025-05-19 | End: 2025-05-20

## 2025-05-19 RX ORDER — GLUCAGON 3 MG/1
1 POWDER NASAL ONCE
Refills: 0 | Status: DISCONTINUED | OUTPATIENT
Start: 2025-05-19 | End: 2025-05-20

## 2025-05-19 RX ORDER — BISACODYL 5 MG
10 TABLET, DELAYED RELEASE (ENTERIC COATED) ORAL ONCE
Refills: 0 | Status: CANCELLED | OUTPATIENT
Start: 2025-05-21 | End: 2025-05-20

## 2025-05-19 RX ORDER — APREPITANT 40 MG/1
40 CAPSULE ORAL ONCE
Refills: 0 | Status: COMPLETED | OUTPATIENT
Start: 2025-05-19 | End: 2025-05-19

## 2025-05-19 RX ORDER — ONDANSETRON HCL/PF 4 MG/2 ML
4 VIAL (ML) INJECTION EVERY 6 HOURS
Refills: 0 | Status: DISCONTINUED | OUTPATIENT
Start: 2025-05-19 | End: 2025-05-20

## 2025-05-19 RX ORDER — APIXABAN 2.5 MG/1
2.5 TABLET, FILM COATED ORAL EVERY 12 HOURS
Refills: 0 | Status: DISCONTINUED | OUTPATIENT
Start: 2025-05-20 | End: 2025-05-20

## 2025-05-19 RX ORDER — CEFADROXIL 500 MG/1
1 CAPSULE ORAL
Qty: 13 | Refills: 0
Start: 2025-05-19

## 2025-05-19 RX ORDER — MAGNESIUM HYDROXIDE 400 MG/5ML
30 SUSPENSION ORAL DAILY
Refills: 0 | Status: DISCONTINUED | OUTPATIENT
Start: 2025-05-19 | End: 2025-05-20

## 2025-05-19 RX ORDER — HYDROMORPHONE/SOD CHLOR,ISO/PF 2 MG/10 ML
0.5 SYRINGE (ML) INJECTION
Refills: 0 | Status: DISCONTINUED | OUTPATIENT
Start: 2025-05-19 | End: 2025-05-19

## 2025-05-19 RX ORDER — HYDROMORPHONE/SOD CHLOR,ISO/PF 2 MG/10 ML
0.5 SYRINGE (ML) INJECTION
Refills: 0 | Status: DISCONTINUED | OUTPATIENT
Start: 2025-05-19 | End: 2025-05-20

## 2025-05-19 RX ORDER — CEFAZOLIN SODIUM IN 0.9 % NACL 3 G/100 ML
2000 INTRAVENOUS SOLUTION, PIGGYBACK (ML) INTRAVENOUS EVERY 8 HOURS
Refills: 0 | Status: COMPLETED | OUTPATIENT
Start: 2025-05-19 | End: 2025-05-20

## 2025-05-19 RX ORDER — ROSUVASTATIN CALCIUM 20 MG/1
5 TABLET, FILM COATED ORAL AT BEDTIME
Refills: 0 | Status: DISCONTINUED | OUTPATIENT
Start: 2025-05-19 | End: 2025-05-20

## 2025-05-19 RX ORDER — HYDROMORPHONE/SOD CHLOR,ISO/PF 2 MG/10 ML
0.2 SYRINGE (ML) INJECTION
Refills: 0 | Status: DISCONTINUED | OUTPATIENT
Start: 2025-05-19 | End: 2025-05-19

## 2025-05-19 RX ORDER — DEXAMETHASONE 0.5 MG/1
8 TABLET ORAL ONCE
Refills: 0 | Status: COMPLETED | OUTPATIENT
Start: 2025-05-20 | End: 2025-05-20

## 2025-05-19 RX ORDER — SERTRALINE 100 MG/1
1 TABLET, FILM COATED ORAL
Refills: 0 | DISCHARGE

## 2025-05-19 RX ORDER — DEXTROSE 50 % IN WATER 50 %
12.5 SYRINGE (ML) INTRAVENOUS ONCE
Refills: 0 | Status: DISCONTINUED | OUTPATIENT
Start: 2025-05-19 | End: 2025-05-20

## 2025-05-19 RX ORDER — ACETAMINOPHEN 500 MG/5ML
1000 LIQUID (ML) ORAL EVERY 6 HOURS
Refills: 0 | Status: COMPLETED | OUTPATIENT
Start: 2025-05-19 | End: 2025-05-20

## 2025-05-19 RX ORDER — OXYCODONE HYDROCHLORIDE 30 MG/1
5 TABLET ORAL
Refills: 0 | Status: DISCONTINUED | OUTPATIENT
Start: 2025-05-19 | End: 2025-05-20

## 2025-05-19 RX ORDER — SENNA 187 MG
2 TABLET ORAL AT BEDTIME
Refills: 0 | Status: DISCONTINUED | OUTPATIENT
Start: 2025-05-19 | End: 2025-05-20

## 2025-05-19 RX ADMIN — CARVEDILOL 12.5 MILLIGRAM(S): 3.12 TABLET, FILM COATED ORAL at 20:10

## 2025-05-19 RX ADMIN — Medication 400 MILLIGRAM(S): at 19:45

## 2025-05-19 RX ADMIN — Medication 500 MILLILITER(S): at 16:39

## 2025-05-19 RX ADMIN — Medication 1 APPLICATION(S): at 10:34

## 2025-05-19 RX ADMIN — Medication 1000 MILLIGRAM(S): at 19:59

## 2025-05-19 RX ADMIN — ROSUVASTATIN CALCIUM 5 MILLIGRAM(S): 20 TABLET, FILM COATED ORAL at 21:36

## 2025-05-19 RX ADMIN — Medication 100 MILLIGRAM(S): at 19:45

## 2025-05-19 RX ADMIN — SODIUM CHLORIDE 75 MILLILITER(S): 9 INJECTION, SOLUTION INTRAVENOUS at 16:44

## 2025-05-19 RX ADMIN — Medication 500 MILLILITER(S): at 22:20

## 2025-05-19 RX ADMIN — APREPITANT 40 MILLIGRAM(S): 40 CAPSULE ORAL at 10:34

## 2025-05-19 RX ADMIN — INSULIN LISPRO 4: 100 INJECTION, SOLUTION INTRAVENOUS; SUBCUTANEOUS at 21:33

## 2025-05-19 RX ADMIN — SODIUM CHLORIDE 125 MILLILITER(S): 9 INJECTION, SOLUTION INTRAVENOUS at 21:33

## 2025-05-19 NOTE — CONSULT NOTE ADULT - SUBJECTIVE AND OBJECTIVE BOX
Patient is a 80y old  Female who presents with a chief complaint of     81 y/o F presented for Right total knee replacement. Procedure was uncomplicated as per post op note. Denies fever, chills, abdominal pian, cough, sob, chest pain. Able to move extremities without difficulty.       REVIEW OF SYSTEMS:  CONSTITUTIONAL: No fever, weight loss, or fatigue  EYES: No eye pain, visual disturbances, or discharge  ENMT:  No difficulty hearing, tinnitus, vertigo; No sinus or throat pain  NECK: No pain or stiffness  RESPIRATORY: No cough, wheezing, chills or hemoptysis; No shortness of breath  CARDIOVASCULAR: No chest pain, palpitations, dizziness, or leg swelling  GASTROINTESTINAL: No abdominal or epigastric pain. No nausea, vomiting, or hematemesis; No diarrhea or constipation. No melena or hematochezia.  GENITOURINARY: No dysuria, frequency, hematuria, or incontinence  NEUROLOGICAL: No headaches, memory loss, loss of strength, numbness, or tremors  SKIN: No itching, burning, rashes, or lesions   MUSCULOSKELETAL: No muscle or back pain  PSYCHIATRIC: No depression, anxiety, mood swings, or difficulty sleeping      PAST MEDICAL & SURGICAL HISTORY:  HTN (hypertension)      HLD (hyperlipidemia)      Uric acid kidney stone      Type II diabetes mellitus      Osteoarthritis of right knee      BMI 45.0-49.9, adult      At risk for sleep apnea      Anxiety      S/P cystoscopy      S/P arthroscopy of left shoulder      S/P cholecystectomy      S/P tubal ligation      S/P cataract surgery      History of left knee replacement          FAMILY HISTORY:  Family history of colon cancer in father (Father)    FH: lymphoma, malignant (Mother)    FH: renal cell carcinoma (Sibling)        SOCIAL HISTORY:  Residence: [place]  [ ] Substance abuse: No   [ ] Tobacco: No   [ ] Alcohol use: NO     Allergies    penicillin (Rash; Other)  Avelox (Rash)    Intolerances        MEDICATIONS  (STANDING):  lactated ringers. 1000 milliLiter(s) (75 mL/Hr) IV Continuous <Continuous>    MEDICATIONS  (PRN):  HYDROmorphone  Injectable 0.2 milliGRAM(s) IV Push every 10 minutes PRN Moderate Pain (4 - 6)  HYDROmorphone  Injectable 0.5 milliGRAM(s) IV Push every 10 minutes PRN Severe Pain (7 - 10)  ondansetron Injectable 4 milliGRAM(s) IV Push once PRN Nausea and/or Vomiting      Vital Signs Last 24 Hrs  T(C): 36.7 (19 May 2025 10:57), Max: 36.7 (19 May 2025 10:57)  T(F): 98 (19 May 2025 10:57), Max: 98 (19 May 2025 10:57)  HR: 70 (19 May 2025 10:57) (70 - 70)  BP: 152/75 (19 May 2025 10:57) (152/75 - 152/75)  BP(mean): --  RR: 18 (19 May 2025 10:57) (18 - 18)  SpO2: --        PHYSICAL EXAM:    GENERAL: NAD, well-groomed, well-developed  HEAD:  Atraumatic, Normocephalic  EYES: EOMI, PERRLA, conjunctiva and sclera clear  ENMT: No tonsillar erythema, exudates, or enlargement; Moist mucous membranes, Good dentition, No lesions  NECK: Supple, No JVD, Normal thyroid  NERVOUS SYSTEM:  Alert & Oriented X3, Good concentration; Moving all 4 extremities; No gross sensory deficits  CHEST/LUNG: Clear to auscultation bilaterally; No rales, rhonchi, wheezing, or rubs  HEART: Regular rate and rhythm; No murmurs, rubs, or gallops  ABDOMEN: Soft, Nontender, Nondistended; Bowel sounds present  EXTREMITIES:  2+ Peripheral Pulses, No clubbing, cyanosis, or edema      LABS:    reviewed           CAPILLARY BLOOD GLUCOSE      POCT Blood Glucose.: 164 mg/dL (19 May 2025 10:51)      RADIOLOGY & ADDITIONAL STUDIES:    Consultant(s) Notes Reviewed:  Yes

## 2025-05-19 NOTE — DISCHARGE NOTE PROVIDER - NSDCCPCAREPLAN_GEN_ALL_CORE_FT
PRINCIPAL DISCHARGE DIAGNOSIS  Diagnosis: Primary localized osteoarthritis of right knee  Assessment and Plan of Treatment:      PRINCIPAL DISCHARGE DIAGNOSIS  Diagnosis: Primary localized osteoarthritis of right knee  Assessment and Plan of Treatment: Your new total knee requires proper care.  Your care provider is your best resource for care information.  Please follow these basic guidelines.  Use pain medication if needed as prescribed.  Ice packs are a helpful addition to your comfort.    Use Cold Packs over your surgical incision for the first 72 hours continuously after surgery, and then, as needed thereafter. Replace inserts every 4 hours. May place directly on the skin.   Your Physical Therapy/Occupational Therapy may include ambulation, transfers, stairs, ADL's (activities of daily living), range of motion exercises, and isometrics.  Your participation is vital to your recovery.  -Your Activity  • Weight Bearing as tolerated with rolling walker.  • Take short, frequent walks increasing the distance that you walk each day as tolerated.  • Change your position every hour to decrease pain and stiffness.  • Continue the exercises taught to you by your physical therapist.  • No driving until cleared by the doctor.  • No tub baths, hot tubs, or swimming pools until instructed by your doctor.  • Do not squat down on the floor.  • Do not kneel or twist your knee.  Keep dressing clean.  Salves, ointments or other topical treatments are not to be used on the wound unless sepcifically recommended by your doctor.  You have a Mepilex dressing. You may shower. Mepilex dressing is water resistant, not waterproof. Do not aim shower stream at surgical site.  Pat dry after showering.  Remove Mepilex dressing in 1 week. You have Prineo/glue over your incision which will be removed in the doctors office.  Keep incision clean. DO NOT APPLY ANYTHING to incision site (salves/ointments/creams).  May shower.  Do not scrub incision site. Pat dry after shower.

## 2025-05-19 NOTE — DISCHARGE NOTE PROVIDER - CARE PROVIDER_API CALL
Alex Wolf  Orthopaedic Surgery  [address], [address]  [place] [address]  Phone: [phone]  Fax: [phone]  Scheduled Appointment: 06/05/2025 11:00 AM

## 2025-05-19 NOTE — PHYSICAL THERAPY INITIAL EVALUATION ADULT - IMPAIRMENTS CONTRIBUTING TO GAIT DEVIATIONS, PT EVAL
dec proprioception, +bilat KNees buckling unable to weight shift/impaired balance/impaired motor control/decreased ROM/decreased sensation

## 2025-05-19 NOTE — BRIEF OPERATIVE NOTE - NSICDXBRIEFPREOP_GEN_ALL_CORE_FT
PRE-OP DIAGNOSIS:  DJD (degenerative joint disease) of knee 19-May-2025 15:38:04 Right Daljit Leone

## 2025-05-19 NOTE — DISCHARGE NOTE PROVIDER - HOSPITAL COURSE
The patient is a 80 year old female with severe osteoarthritis of the right knee.  After admission on May 19, 2025 and receiving pre-operative parenteral prophylactic antibiotics, the patient  underwent an  uncomplicated right total knee replacement by orthopedic surgeon Dr. Wolf.    A medical consultation from the Hospitalist service was obtained for post-operative medical co-management. Typical Physical & occupational therapy modalities post total knee replacement were performed including ambulation training, range of motion, ADL's, and transfers. Eliquis 2.5 mg q 12 hours was given for DVT prophylaxis.  The patient had a clean appearing surgical dressing with no sign of surgical site infections and had a stable neuro / vascular exam of the operated extremity.  After progression of mobility guided by the PT/ OT staff,  the patient was felt to benefit from further rehabilitative care to increase their level of function. This was felt to best be accomplished in a home setting.  Discharge and Orthopedic Care instructions were delineated in the Discharge Plan and reviewed with the patient. All medications were delineated in the medication reconciliation tool and key points were reviewed with the patient. The patient was deemed stable from an Orthopedic & medical standpoint for discharge today.  They will be  following up with Dr. Wolf for office  follow up Orthopedic care at 2 weeks postop or before if necessary.   The patient is a 80 year old female with severe osteoarthritis of the right knee.  After admission on May 19, 2025 and receiving pre-operative parenteral prophylactic antibiotics, the patient  underwent an  uncomplicated right total knee replacement by orthopedic surgeon Dr. Wolf.    A medical consultation from the Hospitalist service was obtained for post-operative medical co-management. Typical Physical & occupational therapy modalities post total knee replacement were performed including ambulation training, range of motion, ADL's, and transfers. Eliquis 2.5 mg q 12 hours was given for DVT prophylaxis.  The patient had a clean appearing surgical dressing with no sign of surgical site infections and had a stable neuro / vascular exam of the operated extremity.  After progression of mobility guided by the PT/ OT staff,  the patient was felt to benefit from further rehabilitative care to increase their level of function. This was felt to best be accomplished in a home setting.  Discharge and Orthopedic Care instructions were delineated in the Discharge Plan and reviewed with the patient. All medications were delineated in the medication reconciliation tool and key points were reviewed with the patient. The patient was deemed stable from an Orthopedic & medical standpoint for discharge today.  They will be  following up with Dr. Wolf for office  follow up Orthopedic care at 2 weeks postop or before if necessary. Patient is going home with home PT.

## 2025-05-19 NOTE — PHYSICAL THERAPY INITIAL EVALUATION ADULT - ADDITIONAL COMMENTS
pvt home with 1 GENOVEVA w/o HR and then stays on main floor no stair to neg inside.  Daughter stays on the second floor but will be available to ast pt upon d/c home as needed.. Pt has a stall shower, RW, cane. PT does not drive. Pt states she was ambulating limited distances prior to surgery due to pain mainly household distances.

## 2025-05-19 NOTE — PHYSICAL THERAPY INITIAL EVALUATION ADULT - IMPAIRED TRANSFERS: SIT/STAND, REHAB EVAL
+buckling bilat Knees/impaired balance/impaired motor control/decreased ROM/decreased sensation/decreased strength

## 2025-05-19 NOTE — DISCHARGE NOTE PROVIDER - NSDCHC_MEDRECSTATUS_GEN_ALL_CORE
Arik Wu is a 17year old male who was brought in for this visit. History was provided by the CAREGIVER. HPI:   Patient presents with:   Well Adolescent Exam  growth on R thumb - growing but not tender  School performance and activities:did pretty (MULTI-VITAMIN/MINERALS) Oral Tab, Take 1 tablet by mouth daily. , Disp: , Rfl:     Allergies:    Peanuts                 ITCHING, ANAPHYLAXIS  Pecan                   HIVES, ITCHING, NAUSEA AND                            VOMITING, SHORTNESS OF BREATH, From Past 48 Hours:  No results found for this or any previous visit (from the past 48 hour(s)).     ASSESSMENT/PLAN:   Denson Saint was seen today for well adolescent exam.    Diagnoses and all orders for this visit:    Well adolescent visit    Encounter for die Admission Reconciliation is Completed  Discharge Reconciliation is Not Complete Admission Reconciliation is Completed  Discharge Reconciliation is Completed

## 2025-05-19 NOTE — DISCHARGE NOTE PROVIDER - NSDCFUADDINST_GEN_ALL_CORE_FT
For Constipation :   • Increase your water intake. Drink at least 8 glasses of water daily.  • Try adding fiber to your diet by eating fruits, vegetables and foods that are rich in grains.  • If you do experience constipation, you may take an over-the-counter stool softener/laxative such as Lynnette Colace, Senekot or  Milk of Magnesia.   For Constipation :   • Increase your water intake. Drink at least 8 glasses of water daily.  • Try adding fiber to your diet by eating fruits, vegetables and foods that are rich in grains.  • If you do experience constipation, you may take an over-the-counter stool softener/laxative such as Lynnette Colace, Senekot or  Milk of Magnesia.    - Call your doctor if you experience:  • An increase in pain not controlled by pain medication or change in activity or  position.  • Temperature greater than 101° F.  • Redness, increased swelling or foul smelling drainage from or around the  incision.  • Numbness, tingling or a change in color or temperature of the operative extremity.  • Call your doctor immediately if you experience chest pain, shortness of breath or calf pain.     You were prescribed Narcan, which is a medication used the setting of a narcotic overdose due to having been prescribed another sedating medication in the past year that may interact with the narcotics you were given. You may choose not to  this medication but [facility] law requires that it is offered to you.

## 2025-05-19 NOTE — PHYSICAL THERAPY INITIAL EVALUATION ADULT - RANGE OF MOTION EXAMINATION, REHAB EVAL
right knee not tested due to surgery/bilateral upper extremity ROM was WNL (within normal limits)/Left LE ROM was WNL (within normal limits)

## 2025-05-19 NOTE — PHYSICAL THERAPY INITIAL EVALUATION ADULT - PERTINENT HX OF CURRENT PROBLEM, REHAB EVAL
81 yo female reports longstanding history of right knee pain rating 10/10 at worst.  She is scheduled for right total knee replacement on 5/19/2025

## 2025-05-19 NOTE — DISCHARGE NOTE PROVIDER - NSDCCPTREATMENT_GEN_ALL_CORE_FT
PRINCIPAL PROCEDURE  Procedure: Right total knee replacement  Findings and Treatment: Your new total knee requires proper care.  Your care provider is your best resource for care information.  Please follow these basic guidelines.  Use pain medication if needed as prescribed.  Ice packs are a helpful addition to your comfort.  Applying a  waterproof ice pack over a cloth or thin towel to the site for 30 minutes per hour may provide benefit by reducing swelling and discomfort.  Your Physical Therapy/Occupational Therapy may include ambulation, transfers, stairs, ADL's (activities of daily living), range of motion exercises, and isometrics.  Your participation is vital to your recovery.  -Your Activity  • Weight Bearing as tolerated with rolling walker.  • Take short, frequent walks increasing the distance that you walk each day as tolerated.  • Change your position every hour to decrease pain and stiffness.  • Continue the exercises taught to you by your physical therapist.  • No driving until cleared by the doctor.  • No tub baths, hot tubs, or swimming pools until instructed by your doctor.  • Do not squat down on the floor.  • Do not kneel or twist your knee.  Keep idressing clean.  Salves, ointments or other topical treatments are not to be used on the wound unless sepcifically recommended by your doctor.  You have a Mepilex dressing. You may shower. Mepilex dressing is water resistant, not waterproof. Do not aim shower stream at surgical site.  Pat dry after showering.  Remove Mepilex dressing in 1 week. You have glue over your incision which will be removed in the doctors office.  Keep incision clean. DO NOT APPLY ANYTHING to incision site (salves/ointments/creams).  May shower.  Do not scrub incision site. Pat dry after shower.  If you have further questions or problems call your doctor's office or go to the emergency room.       PRINCIPAL PROCEDURE  Procedure: Right total knee replacement  Findings and Treatment: Severe DJD right knee.

## 2025-05-19 NOTE — DISCHARGE NOTE PROVIDER - NSDCMRMEDTOKEN_GEN_ALL_CORE_FT
allopurinol 100 mg oral tablet: orally once a day  aspirin 81 mg oral delayed release tablet: 1 tab(s) orally every 12 hours 1 tab by mouth every 12 hours .  Begin 12 hours after eliquis is completed.  carvedilol 12.5 mg oral tablet: 1 tab(s) orally 2 times a day  cefadroxil 500 mg oral capsule: 1 cap(s) orally 2 times a day  Eliquis 2.5 mg oral tablet: 1 tab(s) orally every 12 hours 1 tab by mouth every 12 hours for a total of 14 days.  glipiZIDE 10 mg oral tablet: orally 2 times a day  omeprazole 20 mg oral delayed release capsule: 1 cap(s) orally once a day  rosuvastatin 5 mg oral capsule: orally once a day (at bedtime)  sertraline 50 mg oral tablet: 1 tab(s) orally once a day   acetaminophen 500 mg oral tablet: 2 tab(s) orally every 8 hours  allopurinol 100 mg oral tablet: orally once a day  aspirin 81 mg oral delayed release tablet: 1 tab(s) orally every 12 hours 1 tab by mouth every 12 hours .  Begin 12 hours after eliquis is completed.  carvedilol 12.5 mg oral tablet: 1 tab(s) orally 2 times a day  cefadroxil 500 mg oral capsule: 1 cap(s) orally once a day take daily until all tablets used to prevent infection starting on 5/21/25  Eliquis 2.5 mg oral tablet: 1 tab(s) orally every 12 hours 1 tab by mouth every 12 hours for a total of 14 days.  glipiZIDE 10 mg oral tablet: orally 2 times a day  Narcan 4 mg/0.1 mL nasal spray: 4 milligram(s) intranasally once as needed for narcotic overdose instill one spray into nostril as needed for drug overdose. May repeat dose in alternate nostril if needed in 2-3min while awaiting EMS.  omeprazole 20 mg oral delayed release capsule: 1 cap(s) orally once a day  oxyCODONE 5 mg oral tablet: 1 tab(s) orally every 4 hours as needed for  moderate pain may take 2 tabs, as needed, for Severe Pain. Do not exceed max daily dose of 6 tabs. Do not combine with other sedating medications. [facility] :[id number] MDD: 6  rosuvastatin 5 mg oral capsule: orally once a day (at bedtime)  sertraline 50 mg oral tablet: 1 tab(s) orally once a day

## 2025-05-19 NOTE — DISCHARGE NOTE PROVIDER - NSDCFUSCHEDAPPT_GEN_ALL_CORE_FT
Elvia Morris  [facility]  ORTHOSURG [address] [place] [address]  Scheduled Appointment: 06/05/2025    Alex Wolf  [facility]  ORTHOR[address]  Scheduled Appointment: 07/15/2025

## 2025-05-19 NOTE — CONSULT NOTE ADULT - ASSESSMENT
Aftercare s/p Right total knee replacement  Pain Management:   Acceptable- continue current care Tylenol ATC/Celebrex ATC/ Dilaudid PRN  Continue PT/OT  ANtibiotic: Cefazolin   IV fluids: LR  DVT proph: Eliquis     HTN   HLD   Resume coreg 12.5 mg twice daily   Resume Rosuvastatin 5 mg daily     DM   On Glipizide, hold for now  FS + MICHELLE    History of Uric acid stone   Resume allopurinol 100 mg daily     Anxiety   Resume Sertraline 50 mg daily

## 2025-05-19 NOTE — BRIEF OPERATIVE NOTE - NSICDXBRIEFPOSTOP_GEN_ALL_CORE_FT
POST-OP DIAGNOSIS:  DJD (degenerative joint disease) of knee 19-May-2025 15:41:41 Right Daljit Leone

## 2025-05-20 ENCOUNTER — TRANSCRIPTION ENCOUNTER (OUTPATIENT)
Age: 80
End: 2025-05-20

## 2025-05-20 VITALS
OXYGEN SATURATION: 99 % | TEMPERATURE: 98 F | RESPIRATION RATE: 18 BRPM | SYSTOLIC BLOOD PRESSURE: 163 MMHG | HEART RATE: 62 BPM | DIASTOLIC BLOOD PRESSURE: 77 MMHG

## 2025-05-20 LAB
ANION GAP SERPL CALC-SCNC: 9 MMOL/L — SIGNIFICANT CHANGE UP (ref 5–17)
BUN SERPL-MCNC: 30 MG/DL — HIGH (ref 7–23)
CALCIUM SERPL-MCNC: 8.4 MG/DL — SIGNIFICANT CHANGE UP (ref 8.4–10.5)
CHLORIDE SERPL-SCNC: 101 MMOL/L — SIGNIFICANT CHANGE UP (ref 96–108)
CO2 SERPL-SCNC: 26 MMOL/L — SIGNIFICANT CHANGE UP (ref 22–31)
CREAT SERPL-MCNC: 1.6 MG/DL — HIGH (ref 0.5–1.3)
EGFR: 33 ML/MIN/1.73M2 — LOW
EGFR: 33 ML/MIN/1.73M2 — LOW
GLUCOSE BLDC GLUCOMTR-MCNC: 234 MG/DL — HIGH (ref 70–99)
GLUCOSE SERPL-MCNC: 217 MG/DL — HIGH (ref 70–99)
HCT VFR BLD CALC: 32.4 % — LOW (ref 34.5–45)
HGB BLD-MCNC: 10.5 G/DL — LOW (ref 11.5–15.5)
MCHC RBC-ENTMCNC: 27.6 PG — SIGNIFICANT CHANGE UP (ref 27–34)
MCHC RBC-ENTMCNC: 32.4 G/DL — SIGNIFICANT CHANGE UP (ref 32–36)
MCV RBC AUTO: 85 FL — SIGNIFICANT CHANGE UP (ref 80–100)
NRBC BLD AUTO-RTO: 0 /100 WBCS — SIGNIFICANT CHANGE UP (ref 0–0)
PLATELET # BLD AUTO: 215 K/UL — SIGNIFICANT CHANGE UP (ref 150–400)
POTASSIUM SERPL-MCNC: 4.5 MMOL/L — SIGNIFICANT CHANGE UP (ref 3.5–5.3)
POTASSIUM SERPL-SCNC: 4.5 MMOL/L — SIGNIFICANT CHANGE UP (ref 3.5–5.3)
RBC # BLD: 3.81 M/UL — SIGNIFICANT CHANGE UP (ref 3.8–5.2)
RBC # FLD: 13.7 % — SIGNIFICANT CHANGE UP (ref 10.3–14.5)
SODIUM SERPL-SCNC: 136 MMOL/L — SIGNIFICANT CHANGE UP (ref 135–145)
WBC # BLD: 13.81 K/UL — HIGH (ref 3.8–10.5)
WBC # FLD AUTO: 13.81 K/UL — HIGH (ref 3.8–10.5)

## 2025-05-20 PROCEDURE — C1889: CPT

## 2025-05-20 PROCEDURE — 73560 X-RAY EXAM OF KNEE 1 OR 2: CPT

## 2025-05-20 PROCEDURE — 97165 OT EVAL LOW COMPLEX 30 MIN: CPT

## 2025-05-20 PROCEDURE — 85027 COMPLETE CBC AUTOMATED: CPT

## 2025-05-20 PROCEDURE — 82962 GLUCOSE BLOOD TEST: CPT

## 2025-05-20 PROCEDURE — 97161 PT EVAL LOW COMPLEX 20 MIN: CPT

## 2025-05-20 PROCEDURE — 27447 TOTAL KNEE ARTHROPLASTY: CPT

## 2025-05-20 PROCEDURE — C1776: CPT

## 2025-05-20 PROCEDURE — 99232 SBSQ HOSP IP/OBS MODERATE 35: CPT

## 2025-05-20 PROCEDURE — 36415 COLL VENOUS BLD VENIPUNCTURE: CPT

## 2025-05-20 PROCEDURE — C1713: CPT

## 2025-05-20 PROCEDURE — 97116 GAIT TRAINING THERAPY: CPT

## 2025-05-20 PROCEDURE — 97110 THERAPEUTIC EXERCISES: CPT

## 2025-05-20 PROCEDURE — 97530 THERAPEUTIC ACTIVITIES: CPT

## 2025-05-20 PROCEDURE — 80048 BASIC METABOLIC PNL TOTAL CA: CPT

## 2025-05-20 RX ORDER — CEFADROXIL 500 MG/1
1 CAPSULE ORAL
Qty: 6 | Refills: 0
Start: 2025-05-20 | End: 2025-05-25

## 2025-05-20 RX ORDER — NALOXONE HYDROCHLORIDE 0.4 MG/ML
4 INJECTION, SOLUTION INTRAMUSCULAR; INTRAVENOUS; SUBCUTANEOUS
Qty: 1 | Refills: 0
Start: 2025-05-20

## 2025-05-20 RX ORDER — OXYCODONE HYDROCHLORIDE 30 MG/1
1 TABLET ORAL
Qty: 12 | Refills: 0
Start: 2025-05-20 | End: 2025-05-21

## 2025-05-20 RX ORDER — CEFADROXIL 500 MG/1
500 CAPSULE ORAL DAILY
Refills: 0 | Status: DISCONTINUED | OUTPATIENT
Start: 2025-05-20 | End: 2025-05-20

## 2025-05-20 RX ORDER — ACETAMINOPHEN 500 MG/5ML
2 LIQUID (ML) ORAL
Qty: 0 | Refills: 0 | DISCHARGE
Start: 2025-05-20

## 2025-05-20 RX ADMIN — Medication 100 MILLIGRAM(S): at 04:35

## 2025-05-20 RX ADMIN — DEXAMETHASONE 101.6 MILLIGRAM(S): 0.5 TABLET ORAL at 05:55

## 2025-05-20 RX ADMIN — CARVEDILOL 12.5 MILLIGRAM(S): 3.12 TABLET, FILM COATED ORAL at 08:01

## 2025-05-20 RX ADMIN — CEFADROXIL 500 MILLIGRAM(S): 500 CAPSULE ORAL at 11:04

## 2025-05-20 RX ADMIN — INSULIN LISPRO 2: 100 INJECTION, SOLUTION INTRAVENOUS; SUBCUTANEOUS at 08:00

## 2025-05-20 RX ADMIN — Medication 400 MILLIGRAM(S): at 02:03

## 2025-05-20 RX ADMIN — Medication 1000 MILLIGRAM(S): at 08:01

## 2025-05-20 RX ADMIN — Medication 40 MILLIGRAM(S): at 05:55

## 2025-05-20 RX ADMIN — Medication 1000 MILLIGRAM(S): at 02:18

## 2025-05-20 RX ADMIN — Medication 100 MILLIGRAM(S): at 11:04

## 2025-05-20 RX ADMIN — APIXABAN 2.5 MILLIGRAM(S): 2.5 TABLET, FILM COATED ORAL at 08:01

## 2025-05-20 RX ADMIN — SERTRALINE 50 MILLIGRAM(S): 100 TABLET, FILM COATED ORAL at 11:04

## 2025-05-20 RX ADMIN — Medication 1000 MILLIGRAM(S): at 08:55

## 2025-05-20 NOTE — DISCHARGE NOTE NURSING/CASE MANAGEMENT/SOCIAL WORK - PATIENT PORTAL LINK FT
You can access the FollowMyHealth Patient Portal offered by [facility] by registering at the following website: [email or web address]. By joining [facility]’s FollowMyHealth portal, you will also be able to view your health information using other applications (apps) compatible with our system.

## 2025-05-20 NOTE — PROGRESS NOTE ADULT - ASSESSMENT
Aftercare s/p Right total knee replacement  Pain Management:   Acceptable- continue current care Tylenol ATC/Celebrex ATC/ Dilaudid PRN  Continue PT/OT  ANtibiotic: Cefadroxil   IV fluids: LR  DVT proph: Eliquis     DISHA superimposed on CKD   Continue IV fluids   Monitor BMP     HTN   HLD   Continue coreg 12.5 mg twice daily   Continue Rosuvastatin 5 mg daily     DM   On Glipizide, hold for now  FS + MICHELLE    History of Uric acid stone   Continue allopurinol 100 mg daily     Anxiety   Continue Sertraline 50 mg daily

## 2025-05-20 NOTE — CARE COORDINATION ASSESSMENT. - NSDCPLANSERVICES_GEN_ALL_CORE
CM met with patient at bedside.  Patient. A&O x 4. Pt s/p  PROCEDURES: Total RT   knee replacement.   Pt  resting comfortably / Pt has no complaints at this time.  CM explained role of CM and availability to assist with discharge planning throughout stay.   Provided CM contact information and pt. verbalized understanding.  Patient lives in pvt home with 1 GENOVEVA w/o HR and then stays on main floor no stair to neg inside.  Daughter stays on the second floor but will be available to ast pt upon d/c home as needed.. Pt has a stall shower, RW, cane. PT does not drive. Pt states she was ambulating limited distances prior to surgery due to pain mainly household distances.    Patient identified her daughters as her caregivers at home who will assist her during her recuperation and will transport her home and to MD appointments.   Pt. is agreeable with PT/OT's recommendations for d/c plan to home with HC/PT.  CM explained home care expectations, process, insurance provisions and home safety with good understanding.   Offered list of [facility] and pt. chose [facility] at home care agency.  Provided discharge resources folder. CM made referral accordingly.  Informed them of Anticipated  DC date for 5/21/2025 and for SOC 5/22/25    Pt verbalizing understanding and in agreement with d/c plans.    pharmacy: [facility] [phone]/Home Care CM met with patient at bedside.  Patient. A&O x 4. Pt s/p  PROCEDURES: Total RT   knee replacement.   Pt  resting comfortably / Pt has no complaints at this time.  CM explained role of CM and availability to assist with discharge planning throughout stay.   Provided CM contact information and pt. verbalized understanding.  Patient lives in pvt home with 1 GENOVEVA w/o HR and then stays on main floor no stair to neg inside.  Daughter stays on the second floor but will be available to ast pt upon d/c home as needed.. Pt has a stall shower, RW, cane. PT does not drive. Pt states she was ambulating limited distances prior to surgery due to pain mainly household distances.    Patient identified her daughters as her caregivers at home who will assist her during her recuperation and will transport her home and to MD appointments.   Pt. is agreeable with PT/OT's recommendations for d/c plan to home with HC/PT.  CM explained home care expectations, process, insurance provisions and home safety with good understanding.   Offered list of [facility] and pt. chose [facility] at home care agency.  Provided discharge resources folder. CM made referral accordingly.  Informed them of Anticipated  DC date for 5/20/2025 and for SOC 5/21/25    Pt verbalizing understanding and in agreement with d/c plans.    pharmacy: [facility] [phone]/Home Care

## 2025-05-20 NOTE — DISCHARGE NOTE NURSING/CASE MANAGEMENT/SOCIAL WORK - FINANCIAL ASSISTANCE
[facility] provides services at a reduced cost to those who are determined to be eligible through [facility]’s financial assistance program. Information regarding [facility]’s financial assistance program can be found by going to [email or web address] or by calling [phone].

## 2025-05-20 NOTE — OCCUPATIONAL THERAPY INITIAL EVALUATION ADULT - ADDITIONAL COMMENTS
Pt lives on the main level of a 2 family home 1 [address] no railing. daughter lives on 2nd floor. + stall Pt owns on RW and commode, Pt reports another daughter will assist upon d/c.

## 2025-05-20 NOTE — CARE COORDINATION ASSESSMENT. - NSPASTMEDSURGHISTORY_GEN_ALL_CORE_FT
PAST MEDICAL & SURGICAL HISTORY:  Type II diabetes mellitus      Uric acid kidney stone      HLD (hyperlipidemia)      HTN (hypertension)      S/P cataract surgery      S/P tubal ligation      S/P cholecystectomy      S/P arthroscopy of left shoulder      S/P cystoscopy      Anxiety      At risk for sleep apnea      BMI 45.0-49.9, adult      Osteoarthritis of right knee      History of left knee replacement

## 2025-05-20 NOTE — CARE COORDINATION ASSESSMENT. - NSCAREPROVIDERS_GEN_ALL_CORE_FT
CARE PROVIDERS:  Administration: Aida Landry  Admitting: Alex Wolf  Attending: Alex Wolf  Consultant: Gilson Norman  Infection Control: Araceli León  Nurse: Nataliya Negrete  Nurse: Molly Wood  Occupational Therapy: Hayley Umaña  Override: Miriam Fan  Override: Lizzie Allen  PCA/Nursing Assistant: Charis Unger  Physical Therapy: [address]  Primary Team: Daljit Leone  Primary Team: Franki White  Primary Team: Alonso Byrnes  Primary Team: Skyler Morse  Primary Team: Jarred Alicia  Primary Team: Alicia Null  Registered Dietitian: Anju Berger  Respiratory Therapy: Zamzam Hare  Team: ROXANA BUCKLEY Hospitalists, Team  UR// Supp. Assoc.: Lillie Perry   CARE PROVIDERS:  Administration: Aida Landry  Admitting: Alex Wolf  Attending: Alex Wolf  Consultant: Gilson Norman  Infection Control: Araceli León  Nurse: Nataliya Negrete  Nurse: Molly Wood  Occupational Therapy: Hayley Umaña  Override: Miriam Fan  Override: Lizzie Allen  PCA/Nursing Assistant: Charis Unger  Physical Therapy: [address]  Primary Team: Daljit Leone  Primary Team: Franki White  Primary Team: Alonso Byrnes  Primary Team: Skyler Morse  Primary Team: Jarred Alicia  Primary Team: Alicia Null  Registered Dietitian: Anju Berger  Team: ROXANA NW Hospitalists, Team  UR// Supp. Assoc.: Lillie Perry

## 2025-05-20 NOTE — DISCHARGE NOTE NURSING/CASE MANAGEMENT/SOCIAL WORK - NSSCNAMETXT_GEN_ALL_CORE
[facility] care agency [phone] will reach out to you within 24-72 hours of your discharge to schedule home care visit/eval appointment with you. Please call agency for any queries regarding home care services

## 2025-05-20 NOTE — PROGRESS NOTE ADULT - SUBJECTIVE AND OBJECTIVE BOX
POD  #:  1  S/P  Right TKR                       SUBJECTIVE: Patient seen and examined. Up to side of bed with PT. Full PT evaluation this morning. Patient is motivated to go home. Tolerating diet. +Voiding-Primafit in place and draining to wall suction.    Reported Pain Score = 1/10, Tolerating all medications and pain is well controlled on current regimen. Discussed multi-modal pain regimen with patient who expressed full understanding. Patient requested to have less narcotics prescribed since she used only a few tablets with the left knee.   Denies: CP/SOB/N/V/Numbness/Tingling    OBJECTIVE:     Vital Signs Last 24 Hrs  T(C): 36.4 (20 May 2025 07:37), Max: 36.8 (19 May 2025 23:00)  T(F): 97.6 (20 May 2025 07:37), Max: 98.3 (19 May 2025 23:00)  HR: 58 (20 May 2025 07:37) (54 - 70)  BP: 185/85 (20 May 2025 07:37) (137/66 - 185/85)  RR: 20 (20 May 2025 07:37) (12 - 20)  SpO2: 95% (20 May 2025 07:37) (95% - 100%)    Parameters below as of 20 May 2025 07:37  Patient On (Oxygen Delivery Method): room air        Gen: AAOx3, NAD  Abd: soft, NT, ND  Right Knee:          ROBSON Dressing: clean/dry/intact, flashing green/ok, no erythema or discharge noted   Bilateral LEs:         Sensation:  intact to light touch          Motor exam:  5/5 dorsiflexion/plantarflexion/EHL          2+ DP pulses          calf supple, NT         SCDs in place          MEDICATIONS:  Anticoagulation:  apixaban 2.5 milliGRAM(s) Oral every 12 hours      Pain medications:   acetaminophen     Tablet .. 1000 milliGRAM(s) Oral every 8 hours  HYDROmorphone  Injectable 0.5 milliGRAM(s) IV Push every 3 hours PRN  oxyCODONE    IR 5 milliGRAM(s) Oral every 3 hours PRN  oxyCODONE    IR 10 milliGRAM(s) Oral every 3 hours PRN        A/P :80y Female, Patient stable  s/p Right TKR POD # 1  -    Pain control: Acetaminophen, Oxycodone, Dilaudid   -    Requested less narcotics to go home with. 12 tabs to be prescribed for emergency.  -    Cryotherapy and Elevation of operative extremity to help with pain and swelling  -    Medicine co-management  -    Incentive Spirometry  -    DVT ppx: Eliquis 2.5mg q 12 h   -    Weight bearing status: WBAT   -    Physical Therapy  -    Occupational Therapy  -    Discharge plan: home today pending PT, OT, and Medicine clearance 
Discharge medication calendar:  Eliquis 2.5mg q12h x 2 weeks then ASA EC 81mg q12h x 2 weeks  Omeprazole 20mg QAM x 4 weeks  No Nsaids  cefadroxil 500 mg daily due to renal function  APAP 1000mg q8h x 2-3 weeks  Narcotic PRN  Docusate 100mg TID while taking narcotic  Miralax, Senna, or Bisacodyl PRN for treatment of constipation  
Patient is a 80y old  Female who presents with a chief complaint of Right knee pain (19 May 2025 15:47)      INTERVAL HPI/OVERNIGHT EVENTS:  Overnight nothing significant happened.   Pt was seen today at bedside, no acute complaints. Denies fever, chills, abdominal pain, cough , sob, chest pain.     MEDICATIONS  (STANDING):  acetaminophen     Tablet .. 1000 milliGRAM(s) Oral every 8 hours  allopurinol 100 milliGRAM(s) Oral daily  apixaban 2.5 milliGRAM(s) Oral every 12 hours  carvedilol 12.5 milliGRAM(s) Oral every 12 hours  cefadroxil 500 milliGRAM(s) Oral daily  dextrose 5%. 1000 milliLiter(s) (100 mL/Hr) IV Continuous <Continuous>  dextrose 5%. 1000 milliLiter(s) (50 mL/Hr) IV Continuous <Continuous>  dextrose 5%. 1000 milliLiter(s) (50 mL/Hr) IV Continuous <Continuous>  dextrose 5%. 1000 milliLiter(s) (100 mL/Hr) IV Continuous <Continuous>  dextrose 50% Injectable 25 Gram(s) IV Push once  dextrose 50% Injectable 12.5 Gram(s) IV Push once  dextrose 50% Injectable 25 Gram(s) IV Push once  dextrose 50% Injectable 25 Gram(s) IV Push once  dextrose 50% Injectable 12.5 Gram(s) IV Push once  dextrose 50% Injectable 25 Gram(s) IV Push once  dextrose Oral Gel 15 Gram(s) Oral once  glucagon  Injectable 1 milliGRAM(s) IntraMuscular once  glucagon  Injectable 1 milliGRAM(s) IntraMuscular once  insulin lispro (ADMELOG) corrective regimen sliding scale   SubCutaneous Before meals and at bedtime  lactated ringers. 1000 milliLiter(s) (125 mL/Hr) IV Continuous <Continuous>  pantoprazole    Tablet 40 milliGRAM(s) Oral before breakfast  polyethylene glycol 3350 17 Gram(s) Oral at bedtime  rosuvastatin 5 milliGRAM(s) Oral at bedtime  senna 2 Tablet(s) Oral at bedtime  sertraline 50 milliGRAM(s) Oral daily    MEDICATIONS  (PRN):  aluminum hydroxide/magnesium hydroxide/simethicone Suspension 30 milliLiter(s) Oral four times a day PRN Indigestion  dextrose Oral Gel 15 Gram(s) Oral once PRN Blood Glucose LESS THAN 70 milliGRAM(s)/deciliter  HYDROmorphone  Injectable 0.5 milliGRAM(s) IV Push every 3 hours PRN Breakthrough pain  magnesium hydroxide Suspension 30 milliLiter(s) Oral daily PRN Constipation  ondansetron Injectable 4 milliGRAM(s) IV Push every 6 hours PRN Nausea and/or Vomiting  oxyCODONE    IR 5 milliGRAM(s) Oral every 3 hours PRN Moderate Pain (4 - 6)  oxyCODONE    IR 10 milliGRAM(s) Oral every 3 hours PRN Severe Pain (7 - 10)      Allergies    penicillin (Rash; Other)  Avelox (Rash)    Intolerances        REVIEW OF SYSTEMS:  CONSTITUTIONAL: No fever, weight loss, or fatigue  EYES: No eye pain, visual disturbances, or discharge  ENMT:  No difficulty hearing, tinnitus, vertigo; No sinus or throat pain  NECK: No pain or stiffness  RESPIRATORY: No cough, wheezing, chills or hemoptysis; No shortness of breath  CARDIOVASCULAR: No chest pain, palpitations, lightheadedness, or leg swelling  GASTROINTESTINAL: No abdominal or epigastric pain. No nausea, vomiting, or hematemesis; No diarrhea or constipation. No melena or hematochezia.  GENITOURINARY: No dysuria, frequency, hematuria, or incontinence  NEUROLOGICAL: No headaches, memory loss, vertigo, loss of strength, numbness, or tremors  SKIN: No itching, burning, rashes, or lesions   MUSCULOSKELETAL: No joint pain or swelling; No muscle, back, or extremity pain  PSYCHIATRIC: No depression, anxiety, or mood swings        Vital Signs Last 24 Hrs  T(C): 36.4 (20 May 2025 07:37), Max: 36.8 (19 May 2025 23:00)  T(F): 97.6 (20 May 2025 07:37), Max: 98.3 (19 May 2025 23:00)  HR: 65 (20 May 2025 09:04) (54 - 70)  BP: 149/69 (20 May 2025 09:04) (137/66 - 185/85)  BP(mean): --  RR: 20 (20 May 2025 07:37) (12 - 20)  SpO2: 95% (20 May 2025 07:37) (95% - 100%)    Parameters below as of 20 May 2025 07:37  Patient On (Oxygen Delivery Method): room air        PHYSICAL EXAM:  GENERAL: NAD, well-groomed, well-developed  HEAD:  Atraumatic, Normocephalic  EYES: EOMI, PERRLA, conjunctiva and sclera clear  ENMT: Moist mucous membranes,   NECK: Supple, No JVD, Normal thyroid  NERVOUS SYSTEM:  Alert & Oriented X 3  CHEST/LUNG: Clear to auscultation bilaterally; No rales, rhonchi, wheezing, or rubs  HEART: Regular rate and rhythm; No murmurs, rubs, or gallops  ABDOMEN: Soft, Nontender, Nondistended; Bowel sounds present  EXTREMITIES:  2+ Peripheral Pulses, No clubbing, cyanosis, or edema  SKIN: No rashes or lesions    LABS:                        10.5   13.81 )-----------( 215      ( 20 May 2025 08:21 )             32.4     20 May 2025 08:21    136    |  101    |  30     ----------------------------<  217    4.5     |  26     |  1.60     Ca    8.4        20 May 2025 08:21        Urinalysis Basic - ( 20 May 2025 08:21 )    Color: x / Appearance: x / SG: x / pH: x  Gluc: 217 mg/dL / Ketone: x  / Bili: x / Urobili: x   Blood: x / Protein: x / Nitrite: x   Leuk Esterase: x / RBC: x / WBC x   Sq Epi: x / Non Sq Epi: x / Bacteria: x      CAPILLARY BLOOD GLUCOSE      POCT Blood Glucose.: 234 mg/dL (20 May 2025 07:53)  POCT Blood Glucose.: 316 mg/dL (19 May 2025 21:25)  POCT Blood Glucose.: 176 mg/dL (19 May 2025 16:16)  POCT Blood Glucose.: 164 mg/dL (19 May 2025 10:51)

## 2025-06-05 ENCOUNTER — APPOINTMENT (OUTPATIENT)
Dept: ORTHOPEDIC SURGERY | Facility: CLINIC | Age: 80
End: 2025-06-05
Payer: MEDICARE

## 2025-06-05 DIAGNOSIS — Z96.651 PRESENCE OF RIGHT ARTIFICIAL KNEE JOINT: ICD-10-CM

## 2025-06-05 PROCEDURE — 73562 X-RAY EXAM OF KNEE 3: CPT | Mod: RT

## 2025-06-05 PROCEDURE — 99024 POSTOP FOLLOW-UP VISIT: CPT

## 2025-07-03 ENCOUNTER — NON-APPOINTMENT (OUTPATIENT)
Age: 80
End: 2025-07-03

## 2025-07-15 ENCOUNTER — APPOINTMENT (OUTPATIENT)
Dept: ORTHOPEDIC SURGERY | Facility: CLINIC | Age: 80
End: 2025-07-15
Payer: MEDICARE

## 2025-07-15 PROCEDURE — 99024 POSTOP FOLLOW-UP VISIT: CPT

## (undated) DEVICE — TOURNIQUET CUFF 34" DUAL PORT W PLC

## (undated) DEVICE — DRSG XEROFORM 1 X 8"

## (undated) DEVICE — ELCTR AQUAMANTYS BIPOLAR SEALER 6.0

## (undated) DEVICE — NDL SPINAL 18G X 3.5" (PINK)

## (undated) DEVICE — CRYO/CUFF GRAVITY COOLER KNEE LARGE

## (undated) DEVICE — SOL IRR POUR H2O 1500ML

## (undated) DEVICE — WOUND IRR IRRISEPT W 0.5 CHG

## (undated) DEVICE — ORTHOALIGN PLUS UNIT

## (undated) DEVICE — WARMING BLANKET UPPER ADULT

## (undated) DEVICE — STRYKER PULSE LAVAGE WITH COAXIAL MULTI-ORIFICE TIP

## (undated) DEVICE — SYR LUER LOK 50CC

## (undated) DEVICE — VENODYNE/SCD SLEEVE CALF MEDIUM

## (undated) DEVICE — SUT VICRYL PLUS 1 27" CP UNDYED

## (undated) DEVICE — SYR LUER LOK 20CC

## (undated) DEVICE — DRSG DERMABOND PRINEO 60CM

## (undated) DEVICE — PACK TOTAL KNEE

## (undated) DEVICE — SOL IRR POUR NS 0.9% 500ML

## (undated) DEVICE — DRSG CURITY GAUZE SPONGE 4 X 4" 12-PLY

## (undated) DEVICE — SAW BLADE STRYKER SAGITTAL AGGRESSIVE 12.5X79X1.24MM

## (undated) DEVICE — SOL IRR BAG NS 0.9% 3000ML

## (undated) DEVICE — KIT OPTIVAC CEMENT MIXER 40GM

## (undated) DEVICE — SUT STRATAFIX SPIRAL MONOCRYL PLUS 3-0 30CM PS-2 UNDYED

## (undated) DEVICE — DRSG COMBINE 5X9"

## (undated) DEVICE — ORTHOALIGN KNEEALIGN TIBIAL AND FEMORAL

## (undated) DEVICE — HOOD FLYTE STRYKER HELMET SHIELD

## (undated) DEVICE — SUT MONOSOF 3-0 30" C-16

## (undated) DEVICE — SPECIMEN CONTAINER PET

## (undated) DEVICE — GOWN XL W TOWEL

## (undated) DEVICE — ELCTR ROCKER SWITCH PENCIL BLUE 10FT

## (undated) DEVICE — ELCTR STRYKER NEPTUNE SMOKE EVACUATION PENCIL (GREEN)

## (undated) DEVICE — SUT VICRYL PLUS 2-0 27" CP-1 UNDYED

## (undated) DEVICE — DRSG PICO NPWT 4X12"

## (undated) DEVICE — SAW BLADE STRYKER RECIPROCATING DOUBLE EDGE 68X12.5MM

## (undated) DEVICE — SOLIDIFIER CANN EXPRESS 3K

## (undated) DEVICE — SUT MONOCRYL 3-0 18" PS-1